# Patient Record
Sex: FEMALE | Race: BLACK OR AFRICAN AMERICAN | Employment: FULL TIME | URBAN - METROPOLITAN AREA
[De-identification: names, ages, dates, MRNs, and addresses within clinical notes are randomized per-mention and may not be internally consistent; named-entity substitution may affect disease eponyms.]

---

## 2017-01-18 ENCOUNTER — OFFICE VISIT (OUTPATIENT)
Dept: CARDIOLOGY CLINIC | Age: 67
End: 2017-01-18

## 2017-01-18 VITALS
WEIGHT: 139 LBS | HEIGHT: 62 IN | BODY MASS INDEX: 25.58 KG/M2 | SYSTOLIC BLOOD PRESSURE: 164 MMHG | HEART RATE: 68 BPM | DIASTOLIC BLOOD PRESSURE: 80 MMHG

## 2017-01-18 DIAGNOSIS — I10 ESSENTIAL HYPERTENSION: ICD-10-CM

## 2017-01-18 DIAGNOSIS — R00.0 TACHYCARDIA: Primary | ICD-10-CM

## 2017-01-18 DIAGNOSIS — I11.9 HYPERTENSIVE HEART DISEASE WITHOUT HEART FAILURE: ICD-10-CM

## 2017-01-18 DIAGNOSIS — E78.00 HYPERCHOLESTEROLEMIA: ICD-10-CM

## 2017-01-18 DIAGNOSIS — R00.2 PALPITATIONS: ICD-10-CM

## 2017-01-18 RX ORDER — METOPROLOL TARTRATE 25 MG/1
25 TABLET, FILM COATED ORAL 2 TIMES DAILY
Qty: 60 TAB | Refills: 6 | Status: CANCELLED | OUTPATIENT
Start: 2017-01-18

## 2017-01-18 NOTE — MR AVS SNAPSHOT
Visit Information Date & Time Provider Department Dept. Phone Encounter #  
 1/18/2017  2:00 PM Elin Cardenas MD [facility] [address] [id number] Your Appointments 2/23/2017  9:00 AM  
Follow Up with Elin Cardenas MD  
[facility] ([facility]) Appt Note: 1 month follow up [address] [address] [phone] [facility] [address] Upcoming Health Maintenance Date Due Hepatitis C Screening 1950 DTaP/Tdap/Td series (1 - Tdap) 11/10/1973 FOBT Q 1 YEAR AGE 50-75 11/10/2002 ZOSTER VACCINE AGE 60> 11/10/2012 PAP AKA CERVICAL CYTOLOGY 2/21/2015 BREAST CANCER SCRN MAMMOGRAM 6/25/2016 Allergies as of 1/18/2017  Review Complete On: 12/1/2016 By: Elin Cardenas MD  
  
 Severity Noted Reaction Type Reactions Pcn [Penicillins]  10/28/2010    Hives Current Immunizations  Reviewed on 6/17/2014 No immunizations on file. Not reviewed this visit Vitals BP Pulse Height(growth percentile) Weight(growth percentile) BMI OB Status 164/80 68 5' 2\" (1.575 m) 139 lb (63 kg) 25.42 kg/m2 Postmenopausal  
 Smoking Status Never Smoker Vitals History BMI and BSA Data Body Mass Index Body Surface Area  
 25.42 kg/m 2 1.66 m 2 Preferred Pharmacy Pharmacy Name Phone [facility] [address] [place] [address] [phone] Your Updated Medication List  
  
   
This list is accurate as of: 1/18/17  3:09 PM.  Always use your most recent med list. amLODIPine 2.5 mg tablet Commonly known as:  Thayer Mullet Take 1 Tab by mouth daily. hydroCHLOROthiazide 12.5 mg tablet Commonly known as:  HYDRODIURIL Take 1 Tab by mouth daily. OTHER  
COQ10  
  
 potassium chloride 20 mEq tablet Commonly known as:  K-XANDER, KLOR-CON Take 1 Tab by mouth daily. Introducing [facility]! Odilon Real introduces [employer] patient portal. Now you can access parts of your medical record, email your doctor's office, and request medication refills online. 1. In your internet browser, go to [email or web address] [email or web address] [email or web address] 2. Click on the First Time User? Click Here link in the Sign In box. You will see the New Member Sign Up page. 3. Enter your [employer] Access Code exactly as it appears below. You will not need to use this code after youve completed the sign-up process. If you do not sign up before the expiration date, you must request a new code. · [employer] Access Code: [email or web address] Expires: 1/28/2017 12:21 AM 
 
4. Enter the last four digits of your Social Security Number (xxxx) and Date of Birth (mm/dd/yyyy) as indicated and click Submit. You will be taken to the next sign-up page. 5. Create a [employer] ID. This will be your [employer] login ID and cannot be changed, so think of one that is secure and easy to remember. 6. Create a [employer] password. You can change your password at any time. 7. Enter your Password Reset Question and Answer. This can be used at a later time if you forget your password. 8. Enter your e-mail address. You will receive e-mail notification when new information is available in [address] 9. Click Sign Up. You can now view and download portions of your medical record. 10. Click the Download Summary menu link to download a portable copy of your medical information. If you have questions, please visit the Frequently Asked Questions section of the [employer] website. Remember, [employer] is NOT to be used for urgent needs. For medical emergencies, dial 911. Now available from your iPhone and Android! Please provide this summary of care documentation to your next provider. Your primary care clinician is listed as Leila Hayden. If you have any questions after today's visit, please call [phone].

## 2017-01-18 NOTE — PROGRESS NOTES
1. Have you been to the ER, urgent care clinic since your last visit? Hospitalized since your last visit? No    2. Have you seen or consulted any other health care providers outside of the [address] [place] [address] since your last visit? Include any pap smears or colon screening.  No    Verbal order and read back per Maribeth Gracia MD

## 2017-01-20 RX ORDER — METOPROLOL TARTRATE 25 MG/1
25 TABLET, FILM COATED ORAL 2 TIMES DAILY
Qty: 60 TAB | Refills: 3 | Status: SHIPPED | OUTPATIENT
Start: 2017-01-20 | End: 2017-02-23 | Stop reason: SDUPTHER

## 2017-01-20 NOTE — TELEPHONE ENCOUNTER
Please approve medication per Dr. Hauser Guest last office visit. We can't electronically sign yet.   Thank you

## 2017-01-21 PROBLEM — I11.9 HYPERTENSIVE HEART DISEASE WITHOUT HEART FAILURE: Status: ACTIVE | Noted: 2017-01-21

## 2017-01-21 NOTE — PROGRESS NOTES
PATIENT NAME: Andrea Chapa         61 y.o.      1950              DATE:1/18/2017    REASON FOR VISIT: Palpitations    HISTORY OF PRESENT ILLNESS:The patient's event recorder showed a few premature complexes although her symptoms seem to correlate with sinus tachycardia. The patient had an echocardiogram in November of last year. This showed normal left ventricular size and wall motion. No significant valvular disease. There was evidence for left ventricular hypertrophy and grade 1 diastolic dysfunction. The palpitations are most noticeable at night and are really quite bothersome to her. Denies chest pain. Experiences some shortness of breath on exertion. This is mild. Denies orthopnea and paroxysmal nocturnal dyspnea. The patient does have a history of hypokalemia. She was asked to take potassium supplementation but did not do so because she was afraid of side effects. PAST MEDICAL HISTORY:   Past Medical History:    CTS (carpal tunnel syndrome)                                  Hypercholesterolemia                            11/30/2010    Hypertension                                                  Hypokalemia                                     4/14/2011     Vitamin D deficiency                            11/30/2010    PAST SURGICAL HISTORY:   Past Surgical History:    HX BREAST BIOPSY                                 1990's          Comment:(+) Fibrocystic breast dis. [place] COLONOSCOPY FLX DX W/COLLJ SPEC WHEN PFRMD    2-       Comment:polyp; Dr Nikky Coffman HISTORY:  Social History    Marital status: SINGLE              Spouse name:                       Years of education:                 Number of children:               Social History Main Topics    Smoking status: Never Smoker                                                                Smokeless status: Never Used                        Alcohol use: No              Drug use:  No              Sexual activity: No ALLERGIES:    -- Pcn [Penicillins] -- Hives     CURRENT MEDICATIONS:   Current Outpatient Prescriptions:  potassium chloride (K-DUR, KLOR-CON) 20 mEq tablet, Take 1 Tab by mouth daily. OTHER, COQ10  amLODIPine (NORVASC) 2.5 mg tablet, Take 1 Tab by mouth daily. hydroCHLOROthiazide (HYDRODIURIL) 12.5 mg tablet, Take 1 Tab by mouth daily. metoprolol tartrate (LOPRESSOR) 25 mg tablet, Take 1 Tab by mouth two (2) times a day. No current facility-administered medications for this visit. REVIEW of SYSTEMS:History obtained from chart review and the patient  General ROS: negative for - weight gain or weight loss  Hematological and Lymphatic ROS: negative for - bleeding problems  Respiratory ROS: Please see history of present illness  Cardiovascular ROS: Please see history of present illness     PHYSICAL EXAMINATION:   /80  Pulse 68  Ht 5' 2\" (1.575 m)  Wt 63 kg (139 lb)  BMI 25.42 kg/m2  BP Readings from Last 3 Encounters:  01/18/17 : 164/80  12/01/16 : 148/82  11/09/16 : 134/68    Pulse Readings from Last 3 Encounters:  01/18/17 : 68  12/01/16 : 63  11/09/16 : 76    Wt Readings from Last 3 Encounters:  01/18/17 : 63 kg (139 lb)  12/01/16 : 63.9 kg (140 lb 12.8 oz)  11/09/16 : 63.5 kg (140 lb)    General: Well-developed -American female no apparent distress. Neck: No jugular venous distention. Carotid upstrokes 2+ without bruits. Chest: Clear to auscultation. Heart: PMI in the fifth intercostal space in the midclavicular line. No murmur or gallop. Cardiac rhythm regular. Abdomen: Nontender without masses. Extremities: No edema. Skin: Warm and dry. No stasis changes. Neurologic: Alert, oriented. No facial asymmetry. Speech within normal limits.     IMPRESSION:   Palpitations presumed secondary to inappropriate sinus tachycardia  Hypertension  Hypertensive cardiovascular disease without congestive heart failure  Hypokalemia    PLAN:  KCl 10 mEq daily  Metoprolol 25 mg twice daily  Return to office in 1 month    The diagnoses and plan were discussed with patient. All questions answered. Plan of care agreed to by all concerned. Gilmar Oconnor.  MD Jessi       ,

## 2017-02-23 ENCOUNTER — OFFICE VISIT (OUTPATIENT)
Dept: CARDIOLOGY CLINIC | Age: 67
End: 2017-02-23

## 2017-02-23 VITALS
BODY MASS INDEX: 25.58 KG/M2 | OXYGEN SATURATION: 99 % | WEIGHT: 139 LBS | DIASTOLIC BLOOD PRESSURE: 90 MMHG | HEART RATE: 62 BPM | SYSTOLIC BLOOD PRESSURE: 150 MMHG | HEIGHT: 62 IN

## 2017-02-23 DIAGNOSIS — I11.9 HYPERTENSIVE HEART DISEASE WITHOUT HEART FAILURE: ICD-10-CM

## 2017-02-23 DIAGNOSIS — R00.2 PALPITATIONS: Primary | ICD-10-CM

## 2017-02-23 RX ORDER — METOPROLOL TARTRATE 50 MG/1
50 TABLET ORAL 2 TIMES DAILY
Qty: 60 TAB | Refills: 11 | Status: SHIPPED | OUTPATIENT
Start: 2017-02-23 | End: 2017-07-06 | Stop reason: SDUPTHER

## 2017-02-23 NOTE — PROGRESS NOTES
PATIENT NAME: Venessa Everett         61 y.o.      1950              DATE:2/23/2017    REASON FOR VISIT: Hypertension. Palpitations    HISTORY OF PRESENT ILLNESS: Patient was begun on metoprolol 25 mg p.o. twice daily. Was supposed to be taking hydrochlorothiazide for blood pressure control. She began to feel \"funny\" while taking the hydrochlorothiazide and stop this. Her symptoms apparently consisted of a fullness in her head that she feels when her blood pressure is elevated. It is unclear as to why she stopped an antihypertensive if she felt that her blood pressure was elevated, but she does have a history of intermittent compliance. Continues to experience rapid palpitations. Has a history of inappropriate sinus tachycardia and premature ventricular complexes. Denies chest pain denies dyspnea on exertion, orthopnea, paroxysmal nocturnal dyspnea. Denies syncope and presyncope. PAST MEDICAL HISTORY:   Past Medical History:  No date: CTS (carpal tunnel syndrome)  11/30/2010: Hypercholesterolemia  No date: Hypertension  4/14/2011: Hypokalemia  11/30/2010: Vitamin D deficiency    PAST SURGICAL HISTORY:   Past Surgical History:  1990's: HX BREAST BIOPSY      Comment: (+) Fibrocystic breast dis. 2-: IN COLONOSCOPY FLX DX W/COLLJ SPEC WHEN PFRMD      Comment: polyp; Dr Javid Schroeder:  Social History    Marital status: SINGLE              Spouse name:                       Years of education:                 Number of children:               Social History Main Topics    Smoking status: Never Smoker                                                                Smokeless status: Never Used                        Alcohol use: No              Drug use:  No              Sexual activity: No                       ALLERGIES:    -- Pcn [Penicillins] -- Hives     CURRENT MEDICATIONS:   Current Outpatient Prescriptions:  metoprolol tartrate (LOPRESSOR) 50 mg tablet, Take 1 Tab by mouth two (2) times a day. potassium chloride (K-DUR, KLOR-CON) 20 mEq tablet, Take 1 Tab by mouth daily. OTHER, COQ10  amLODIPine (NORVASC) 2.5 mg tablet, Take 1 Tab by mouth daily. hydroCHLOROthiazide (HYDRODIURIL) 12.5 mg tablet, Take 1 Tab by mouth daily. No current facility-administered medications for this visit. REVIEW of SYSTEMS:History obtained from the patient  General ROS: negative for - weight gain or weight loss  Hematological and Lymphatic ROS: negative for - bleeding problems  Respiratory ROS: no cough, shortness of breath, or wheezing  Cardiovascular ROS: Please see history of present illness  Neurological ROS: no TIA or stroke symptoms     PHYSICAL EXAMINATION:   /90 (BP 1 Location: Right arm, BP Patient Position: Sitting)  Pulse 62  Ht 5' 2\" (1.575 m)  Wt 63 kg (139 lb)  SpO2 99%  BMI 25.42 kg/m2  BP Readings from Last 3 Encounters:  02/23/17 : 150/90  01/18/17 : 164/80  12/01/16 : 148/82    Pulse Readings from Last 3 Encounters:  02/23/17 : 62  01/18/17 : 68  12/01/16 : 63    Wt Readings from Last 3 Encounters:  02/23/17 : 63 kg (139 lb)  01/18/17 : 63 kg (139 lb)  12/01/16 : 63.9 kg (140 lb 12.8 oz)    General: Well-developed -American female no apparent distress  Neck: No jugular venous distention. Carotid upstrokes 2+ without bruits. Chest: Clear to auscultation. Heart: PMI not palpable. Regular rhythm. No murmur or gallop. Abdomen: Nontender without masses or organomegaly. Extremities: No edema. Dorsalis pedis and posterior tibial pulses intact. EKG: Sinus rhythm. First-degree AV block    IMPRESSION:   Hypertension poorly controlled  Poor compliance with medical regimen  Palpitations partially due to inappropriate sinus tachycardia partially due to premature complexes  First-degree AV block    PLAN:  The importance of taking antihypertensives on a regular basis discussed with the patient at length.   Increase metoprolol to 50 mg p.o. twice daily  Turn to office in 1 month    The diagnoses and plan were discussed with patient. All questions answered. Plan of care agreed to by all concerned. Irish Guerrier.  MD Jessi       ,

## 2017-02-23 NOTE — MR AVS SNAPSHOT
Visit Information Date & Time Provider Department Dept. Phone Encounter #  
 2/23/2017  9:00 AM Bashir Hill MD [facility] [address] [id number] Upcoming Health Maintenance Date Due Hepatitis C Screening 1950 DTaP/Tdap/Td series (1 - Tdap) 11/10/1973 FOBT Q 1 YEAR AGE 50-75 11/10/2002 ZOSTER VACCINE AGE 60> 11/10/2012 PAP AKA CERVICAL CYTOLOGY 2/21/2015 BREAST CANCER SCRN MAMMOGRAM 6/25/2016 Allergies as of 2/23/2017  Review Complete On: 1/21/2017 By: Bashri Hill MD  
  
 Severity Noted Reaction Type Reactions Pcn [Penicillins]  10/28/2010    Hives Current Immunizations  Reviewed on 6/17/2014 No immunizations on file. Not reviewed this visit You Were Diagnosed With   
  
 Codes Comments Palpitations    -  Primary ICD-10-CM: R00.2 ICD-9-CM: 785.1 Hypertensive heart disease without heart failure     ICD-10-CM: I11.9 ICD-9-CM: 402.90 Vitals BP  
  
  
  
  
  
 150/90 (BP 1 Location: Right arm, BP Patient Position: Sitting) Vitals History BMI and BSA Data Body Mass Index Body Surface Area  
 25.42 kg/m 2 1.66 m 2 Preferred Pharmacy Pharmacy Name Phone [facility] [address] [place] [address] [phone] Your Updated Medication List  
  
   
This list is accurate as of: 2/23/17 10:20 AM.  Always use your most recent med list. amLODIPine 2.5 mg tablet Commonly known as:  Bertha Croon Take 1 Tab by mouth daily. hydroCHLOROthiazide 12.5 mg tablet Commonly known as:  HYDRODIURIL Take 1 Tab by mouth daily. metoprolol tartrate 50 mg tablet Commonly known as:  LOPRESSOR Take 1 Tab by mouth two (2) times a day. OTHER  
COQ10  
  
 potassium chloride 20 mEq tablet Commonly known as:  K-DUR, KLOR-CON Take 1 Tab by mouth daily. Prescriptions Printed Refills metoprolol tartrate (LOPRESSOR) 50 mg tablet 11 Sig: Take 1 Tab by mouth two (2) times a day. Class: Print Route: Oral  
  
We Performed the Following AMB POC EKG ROUTINE W/ 12 LEADS, INTER & REP [23065 CPT(R)] Introducing [facility]! [employer] introduces [employer] patient portal. Now you can access parts of your medical record, email your doctor's office, and request medication refills online. 1. In your internet browser, go to [email or web address] 2. Click on the First Time User? Click Here link in the Sign In box. You will see the New Member Sign Up page. 3. Enter your [employer] Access Code exactly as it appears below. You will not need to use this code after youve completed the sign-up process. If you do not sign up before the expiration date, you must request a new code. · [employer] Access Code: [email or web address] Expires: 5/24/2017  8:41 AM 
 
4. Enter the last four digits of your Social Security Number (xxxx) and Date of Birth (mm/dd/yyyy) as indicated and click Submit. You will be taken to the next sign-up page. 5. Create a [employer] ID. This will be your [employer] login ID and cannot be changed, so think of one that is secure and easy to remember. 6. Create a [employer] password. You can change your password at any time. 7. Enter your Password Reset Question and Answer. This can be used at a later time if you forget your password. 8. Enter your e-mail address. You will receive e-mail notification when new information is available in [address] 9. Click Sign Up. You can now view and download portions of your medical record. 10. Click the Download Summary menu link to download a portable copy of your medical information. If you have questions, please visit the Frequently Asked Questions section of the [employer] website. Remember, [employer] is NOT to be used for urgent needs. For medical emergencies, dial 911. Now available from your iPhone and Android! Please provide this summary of care documentation to your next provider. Your primary care clinician is listed as Marylou Stephens. If you have any questions after today's visit, please call [phone].

## 2017-07-06 ENCOUNTER — TELEPHONE (OUTPATIENT)
Dept: CARDIOLOGY CLINIC | Age: 67
End: 2017-07-06

## 2017-07-06 NOTE — TELEPHONE ENCOUNTER
Patient called today requested refills for her Lopressor 25 mg twice a day. Per Dr. Padilla Dumont last office note 2/23/17, \"PLAN:  The importance of taking antihypertensives on a regular basis discussed with the patient at length. Increase metoprolol to 50 mg p.o. twice daily  Turn to office in 1 month. \"    Patient states she did not increase her Lopressor to 50 mg twice a day. I explained to patient Dr. Padilla Dumont wanted to increase lopressor due to her BP and palps. Patient states she is not going to increase med she does not want to get sick and she missed her follow up appointment do to work and she will have to call back to reschedule appointment when she can get time off. I made Dr. Padilla Dumont aware. Mckenzie Quesada     Verbal order and read back per Umesh Gutierrez MD   [facility] to continue Lopressor 25 mg twice a day    Prescription sent to [address] [place] [address] as patient requested.   Mckenzie Quesada

## 2017-07-07 RX ORDER — METOPROLOL TARTRATE 25 MG/1
25 TABLET, FILM COATED ORAL 2 TIMES DAILY
Qty: 60 TAB | Refills: 6 | OUTPATIENT
Start: 2017-07-07 | End: 2018-04-30 | Stop reason: SDUPTHER

## 2018-04-30 ENCOUNTER — OFFICE VISIT (OUTPATIENT)
Dept: CARDIOLOGY CLINIC | Age: 68
End: 2018-04-30

## 2018-04-30 VITALS
WEIGHT: 133 LBS | HEIGHT: 62 IN | OXYGEN SATURATION: 98 % | SYSTOLIC BLOOD PRESSURE: 140 MMHG | BODY MASS INDEX: 24.48 KG/M2 | DIASTOLIC BLOOD PRESSURE: 68 MMHG | HEART RATE: 72 BPM

## 2018-04-30 DIAGNOSIS — R00.2 PALPITATIONS: Primary | ICD-10-CM

## 2018-04-30 DIAGNOSIS — I10 ESSENTIAL HYPERTENSION: ICD-10-CM

## 2018-04-30 RX ORDER — METOPROLOL TARTRATE 50 MG/1
50 TABLET ORAL 2 TIMES DAILY
Qty: 60 TAB | Refills: 6 | Status: SHIPPED | OUTPATIENT
Start: 2018-04-30 | End: 2019-02-13 | Stop reason: ALTCHOICE

## 2018-04-30 NOTE — MR AVS SNAPSHOT
[address] [address] [address] [address] [phone] Patient: Lev Singer MRN: [id number] VWX:[id number] Visit Information Date & Time Provider Department Dept. Phone Encounter #  
 4/30/2018  8:40 AM Orlando Molina MD [facility] [address] [id number] Upcoming Health Maintenance Date Due Hepatitis C Screening 1950 DTaP/Tdap/Td series (1 - Tdap) 11/10/1973 FOBT Q 1 YEAR AGE 50-75 11/10/2002 ZOSTER VACCINE AGE 60> 9/10/2012 BREAST CANCER SCRN MAMMOGRAM 6/25/2016 GLAUCOMA SCREENING Q2Y 11/10/2017 Bone Densitometry (Dexa) Screening 11/10/2017 Pneumococcal 65+ Low/Medium Risk (1 of 2 - PCV13) 11/10/2017 Influenza Age 5 to Adult 8/1/2018 Allergies as of 4/30/2018  Review Complete On: 2/23/2017 By: Indio Costello Severity Noted Reaction Type Reactions Pcn [Penicillins]  10/28/2010    Hives Current Immunizations  Reviewed on 6/17/2014 No immunizations on file. Not reviewed this visit You Were Diagnosed With   
  
 Codes Comments Essential hypertension    -  Primary ICD-10-CM: I10 
ICD-9-CM: 401.9 Vitals BP Pulse Height(growth percentile) Weight(growth percentile) SpO2 BMI  
 140/68 72 5' 2\" (1.575 m) 133 lb (60.3 kg) 98% 24.33 kg/m2 OB Status Smoking Status Postmenopausal Never Smoker Vitals History BMI and BSA Data Body Mass Index Body Surface Area  
 24.33 kg/m 2 1.62 m 2 Preferred Pharmacy Pharmacy Name Phone [facility] [address] [phone] Your Updated Medication List  
  
   
This list is accurate as of 4/30/18  9:25 AM.  Always use your most recent med list.  
  
  
  
  
 metoprolol tartrate 25 mg tablet Commonly known as:  LOPRESSOR Take 1 Tab by mouth two (2) times a day.   
  
 OTHER  
COQ10  
  
  
  
  
 We Performed the Following AMB POC EKG ROUTINE W/ 12 LEADS, INTER & REP [14483 CPT(R)] Introducing [facility]! Karina Webb introduces [employer] patient portal. Now you can access parts of your medical record, email your doctor's office, and request medication refills online. 1. In your internet browser, go to [email or web address] 2. Click on the First Time User? Click Here link in the Sign In box. You will see the New Member Sign Up page. 3. Enter your [employer] Access Code exactly as it appears below. You will not need to use this code after youve completed the sign-up process. If you do not sign up before the expiration date, you must request a new code. · [employer] Access Code: [email or web address] Expires: 7/29/2018  8:15 AM 
 
4. Enter the last four digits of your Social Security Number (xxxx) and Date of Birth (mm/dd/yyyy) as indicated and click Submit. You will be taken to the next sign-up page. 5. Create a Tr[employer]is ID. This will be your Tr[employer] login ID and cannot be changed, so think of one that is secure and easy to remember. 6. Create a [employer] password. You can change your password at any time. 7. Enter your Password Reset Question and Answer. This can be used at a later time if you forget your password. 8. Enter your e-mail address. You will receive e-mail notification when new information is available in [address] 9. Click Sign Up. You can now view and download portions of your medical record. 10. Click the Download Summary menu link to download a portable copy of your medical information. If you have questions, please visit the Frequently Asked Questions section of the [employer] website. Remember, [employer] is NOT to be used for urgent needs. For medical emergencies, dial 911. Now available from your iPhone and Android! Please provide this summary of care documentation to your next provider. Your primary care clinician is listed as Afsaneh Peterson. If you have any questions after today's visit, please call [phone].

## 2018-04-30 NOTE — PROGRESS NOTES
1. Have you been to the ER, urgent care clinic since your last visit? Hospitalized since your last visit? No    2. Have you seen or consulted any other health care providers outside of the [address] [place] [address] since your last visit? Include any pap smears or colon screening.  No

## 2018-04-30 NOTE — PATIENT INSTRUCTIONS
Increase metoprolol to 50 mg twice daily. Take two 25 mg tablets twice a day until you run out of 25 mg tabs. Then you can refill the prescription for 50 mg tablets and you will take one of those twice daily.  Return in 2 weeks for blood pressure/heart rate check

## 2018-04-30 NOTE — PROGRESS NOTES
HISTORY OF PRESENT ILLNESS  Milan Bojorquez is a 74 y.o. female. HPI  This is a former patient of the late Dr. Maddison Jain who came for her first appointment with me. She continues with the recurrent palpitations as a fluttering or a racing heartbeat. She is unable to describe the duration of the palpitations but indicates that it lasts at times for half an hour. It could be as short as a few seconds as well. She denies associates symptoms other than palpitations. She has had no chest pain, dyspnea, orthopnea or PND. She has had no dizziness or syncope. She has had no symptoms to indicate TIA or amaurosis fugax. She had an echocardiogram on 11/8/2016 which demonstrated normal left ventricular systolic function with an EF in the 55-60% range. There was no significant valvular pathology. Left atrial dimension was normal with a volume index of 25 ml/m2. She had the 30 day event recorder in January of 2017 which demonstrated no significant tachyarrhythmia. She has been on metoprolol 25 mg twice a day. She is a nonsmoker. She has a history of hypertension but no diabetes. She has no family history of coronary artery disease. Review of Systems   Constitutional: Negative for malaise/fatigue and weight loss. HENT: Negative for hearing loss. Eyes: Negative for blurred vision and double vision. Respiratory: Negative for shortness of breath. Cardiovascular: Positive for palpitations. Negative for chest pain, orthopnea, claudication, leg swelling and PND. Gastrointestinal: Negative for blood in stool, heartburn and melena. Genitourinary: Negative for dysuria, frequency, hematuria and urgency. Musculoskeletal: Negative for back pain and joint pain. Skin: Negative for itching and rash. Neurological: Negative for dizziness, loss of consciousness and weakness. Psychiatric/Behavioral: Negative for depression and memory loss. The patient is nervous/anxious.         Physical Exam Constitutional: She is oriented to person, place, and time. She appears well-developed and well-nourished. HENT:   Head: Normocephalic and atraumatic. Eyes: Conjunctivae are normal. Pupils are equal, round, and reactive to light. Neck: Normal range of motion. Neck supple. No JVD present. Cardiovascular: Normal rate, regular rhythm, S1 normal and S2 normal.   No extrasystoles are present. PMI is not displaced. Exam reveals no gallop and no friction rub. No murmur heard. Pulses:       Carotid pulses are 3+ on the right side, and 3+ on the left side. Pulmonary/Chest: Effort normal. She has no rales. Abdominal: Soft. There is no tenderness. Musculoskeletal: She exhibits no edema. Neurological: She is alert and oriented to person, place, and time. No cranial nerve deficit. Skin: Skin is warm and dry. Psychiatric: She has a normal mood and affect. Her behavior is normal.     Visit Vitals    /68    Pulse 72    Ht 5' 2\" (1.575 m)    Wt 60.3 kg (133 lb)    SpO2 98%    BMI 24.33 kg/m2       Past Medical History:   Diagnosis Date    CTS (carpal tunnel syndrome)     Hypercholesterolemia 11/30/2010    Hypertension     Hypokalemia 4/14/2011    Vitamin D deficiency 11/30/2010       Social History     Social History    Marital status: SINGLE     Spouse name: N/A    Number of children: N/A    Years of education: N/A     Occupational History    Not on file.      Social History Main Topics    Smoking status: Never Smoker    Smokeless tobacco: Never Used    Alcohol use No    Drug use: No    Sexual activity: No     Other Topics Concern    Not on file     Social History Narrative       Family History   Problem Relation Age of Onset    Hypertension Mother     Heart Disease Mother    [place] Arthritis-rheumatoid Mother     Cancer Father     Heart Disease Father     Heart Attack Father     Cancer Sister      breast CA    Breast Cancer Sister 39    Other Son      brain tumor       Past Surgical History:   Procedure Laterality Date    HX BREAST BIOPSY  1990's    (+) Fibrocystic breast dis.  [place] COLONOSCOPY FLX DX W/COLLJ SPEC WHEN PFRMD  2-    polyp; Dr Douglas Adjutant       Current Outpatient Prescriptions   Medication Sig Dispense Refill    metoprolol tartrate (LOPRESSOR) 25 mg tablet Take 1 Tab by mouth two (2) times a day. 60 Tab 6    OTHER COQ10         EKG: normal EKG, normal sinus rhythm, unchanged from previous tracings. ASSESSMENT and PLAN  Encounter Diagnoses   Name Primary?  Palpitations Yes    Essential hypertension    She continues with recurrent palpitations. There has been no documented tachyarrhythmia thus far by 30 day event recorder. At this point, I will simply increase metoprolol to 50 mg twice a day for the purpose of controlling her palpitations and also blood pressure. If she continues with recurrent palpitations, I would then consider implantable loop recorder at that point.

## 2019-01-04 ENCOUNTER — TELEPHONE (OUTPATIENT)
Dept: CARDIOLOGY CLINIC | Age: 69
End: 2019-01-04

## 2019-01-04 NOTE — TELEPHONE ENCOUNTER
Dr. Ashley Lange received a letter from patient's insurance company stating: \"Our claims record suggests that your patient may be demonstrating a pattern of low adherence to cardiovascular  medication, and may be at high risk of disease exacerbation or complication. Medication nonadherence results in  approximately 125,000 preventable deaths a year. Patient education and engagement can help providers reduce the risk  for nonadherence. \"    Medication in question is metoprolol. Called patient to discuss. Left message on answering machine for her to call back.

## 2019-01-07 NOTE — TELEPHONE ENCOUNTER
Patient returned called verified name and , patient states she has been taking her Metoprolol 50mg once a day instead of twice a day, educated patient on the importance of taking her medication as prescribed, patient states will begin taking medication as prescribed, scheduled follow up appointment at patients request for 2019 patient declined first available appointment this month. This has been fully explained to the patient, who indicates understanding.

## 2019-02-12 ENCOUNTER — OFFICE VISIT (OUTPATIENT)
Dept: CARDIOLOGY CLINIC | Age: 69
End: 2019-02-12

## 2019-02-12 VITALS
SYSTOLIC BLOOD PRESSURE: 180 MMHG | HEIGHT: 62 IN | OXYGEN SATURATION: 99 % | BODY MASS INDEX: 24.48 KG/M2 | WEIGHT: 133 LBS | DIASTOLIC BLOOD PRESSURE: 92 MMHG | HEART RATE: 56 BPM

## 2019-02-12 DIAGNOSIS — I10 ESSENTIAL HYPERTENSION: ICD-10-CM

## 2019-02-12 DIAGNOSIS — I11.9 HYPERTENSIVE HEART DISEASE WITHOUT HEART FAILURE: ICD-10-CM

## 2019-02-12 DIAGNOSIS — R00.2 PALPITATIONS: Primary | ICD-10-CM

## 2019-02-12 RX ORDER — HYDROCHLOROTHIAZIDE 12.5 MG/1
12.5 CAPSULE ORAL DAILY
COMMUNITY
End: 2019-03-29

## 2019-02-12 RX ORDER — CARVEDILOL 25 MG/1
25 TABLET ORAL 2 TIMES DAILY WITH MEALS
Qty: 60 TAB | Refills: 6 | Status: SHIPPED | OUTPATIENT
Start: 2019-02-12 | End: 2019-03-04 | Stop reason: SINTOL

## 2019-02-12 RX ORDER — AMLODIPINE BESYLATE 2.5 MG/1
TABLET ORAL DAILY
COMMUNITY
End: 2019-03-04 | Stop reason: SDUPTHER

## 2019-02-12 NOTE — PROGRESS NOTES
HISTORY OF PRESENT ILLNESS  Nicole Huston is a 79 y.o. female. HPI  She continues with occasional palpitation as a flutter or flip flop, but no prolonged palpitation. The palpitation lasts for a few minutes at the longest. She continues with mild dyspnea on exertion chronically with no change. She denies chest pain, resting dyspnea, orthopnea, PND. She denies dizziness or syncope. She denies any symptoms of TIA or amaurosis fugax. She has been out of Amlodipine as her primary care physician has retired. Her blood pressure is elevated up to 180-190 today. She had an echocardiogram on 11/8/2016 which demonstrated normal left ventricular systolic function with an EF in the 55-60% range. There was no significant valvular pathology. Left atrial dimension was normal with a volume index of 25 ml/m2. She had the 30 day event recorder in January of 2017 which demonstrated no significant tachyarrhythmia. She has been on metoprolol 25 mg twice a day. She is a nonsmoker. She has a history of hypertension but no diabetes. She has no family history of coronary artery disease. Review of Systems   Constitutional: Negative for malaise/fatigue and weight loss. HENT: Negative for hearing loss. Eyes: Negative for blurred vision and double vision. Respiratory: Negative for shortness of breath. Cardiovascular: Positive for palpitations. Negative for chest pain, orthopnea, claudication and PND. Gastrointestinal: Negative for blood in stool, heartburn and melena. Genitourinary: Negative for dysuria, frequency, hematuria and urgency. Musculoskeletal: Negative for back pain and joint pain. Skin: Negative for itching and rash. Neurological: Negative for dizziness, loss of consciousness and weakness. Psychiatric/Behavioral: Negative for depression and memory loss. Physical Exam   Constitutional: She is oriented to person, place, and time. She appears well-developed and well-nourished.    HENT: Head: Normocephalic and atraumatic. Eyes: Conjunctivae are normal. Pupils are equal, round, and reactive to light. Neck: Normal range of motion. Neck supple. No JVD present. Cardiovascular: Normal rate, regular rhythm, S1 normal and S2 normal.  No extrasystoles are present. PMI is not displaced. Exam reveals no gallop and no friction rub. No murmur heard. Pulses:       Carotid pulses are 3+ on the right side, and 3+ on the left side. Pulmonary/Chest: Effort normal. She has no rales. Abdominal: Soft. There is no tenderness. Musculoskeletal: She exhibits no edema. Neurological: She is alert and oriented to person, place, and time. No cranial nerve deficit. Skin: Skin is warm and dry. Psychiatric: She has a normal mood and affect.  Her behavior is normal.     Visit Vitals  BP (!) 180/92   Pulse (!) 56   Ht 5' 2\" (1.575 m)   Wt 60.3 kg (133 lb)   SpO2 99%   BMI 24.33 kg/m²       Past Medical History:   Diagnosis Date    CTS (carpal tunnel syndrome)     Hypercholesterolemia 11/30/2010    Hypertension     Hypokalemia 4/14/2011    Vitamin D deficiency 11/30/2010       Social History     Socioeconomic History    Marital status: SINGLE     Spouse name: Not on file    Number of children: Not on file    Years of education: Not on file    Highest education level: Not on file   Social Needs    Financial resource strain: Not on file    Food insecurity - worry: Not on file    Food insecurity - inability: Not on file   [place] needs - medical: Not on file   [employer] needs - non-medical: Not on file   Occupational History    Not on file   Tobacco Use    Smoking status: Never Smoker    Smokeless tobacco: Never Used   Substance and Sexual Activity    Alcohol use: No    Drug use: No    Sexual activity: No   Other Topics Concern    Not on file   Social History Narrative    Not on file       Family History   Problem Relation Age of Onset    Hypertension Mother     Heart Disease Mother     Arthritis-rheumatoid Mother     Cancer Father     Heart Disease Father     Heart Attack Father    [place] Sister         breast CA    Breast Cancer Sister 39    Other Son         brain tumor       Past Surgical History:   Procedure Laterality Date    HX BREAST BIOPSY  1990's    (+) Fibrocystic breast dis.  [place] COLONOSCOPY FLX DX W/COLLJ SPEC WHEN PFRMD  2-    polyp; Dr Dotty Contreras       Current Outpatient Medications   Medication Sig Dispense Refill    hydroCHLOROthiazide (MICROZIDE) 12.5 mg capsule Take 12.5 mg by mouth daily.  metoprolol tartrate (LOPRESSOR) 50 mg tablet Take 1 Tab by mouth two (2) times a day. 60 Tab 6    amLODIPine (NORVASC) 2.5 mg tablet Take  by mouth daily. EKG: normal EKG, normal sinus rhythm, nonspecific ST and T waves changes, sinus bradycardia. ASSESSMENT and PLAN  Encounter Diagnoses   Name Primary?  Palpitations Yes    Essential hypertension     Hypertensive heart disease without heart failure    In general, she has been doing well. She has had no symptoms to indicate angina or cardiac decompensation. Her palpitation seems to be related to benign PVCs or PACs although it could possibly short bursts of atrial fibrillation or SVT. Since she has been off Amlodipine, her blood pressure has been out of control and at this point, I will try her on Carvedilol 25 mg twice daily in place of Metoprolol. If her blood pressure remains elevated, I would then add Amlodipine back to her regimen.

## 2019-02-12 NOTE — PATIENT INSTRUCTIONS
Stop taking  metoprolol   Start taking coreg 25mg twice daily   BMP and HR check in 2 weeks  Follow up in 6 monthsCarvedilol (By mouth)   Carvedilol (all-JE-vov-ol)  Treats high blood pressure and heart failure. Also reduces the risk of death after a heart attack. This medicine is a beta-blocker. Brand Name(s): Coreg, Coreg CR   There may be other brand names for this medicine. When This Medicine Should Not Be Used: This medicine is not right for everyone. Do not use it if you had an allergic reaction to carvedilol, or if you have asthma, severe liver disease, or certain heart problems. Ask your doctor about these heart problems. How to Use This Medicine:   Long Acting Capsule, Tablet  · Take your medicine as directed. Your dose may need to be changed several times to find what works best for you. · It is best to take this medicine with food or milk. · Extended-release capsule instructions:   ¨ Take the capsule in the morning with food. ¨ Swallow the capsule whole. Do not crush or chew it. ¨ If you cannot swallow the capsule, you may open it and sprinkle the medicine over a spoonful of applesauce. Swallow the applesauce right away. · Read and follow the patient instructions that come with this medicine. Talk to your doctor or pharmacist if you have any questions. · Store the medicine in a closed container at room temperature, away from heat, moisture, and direct light. · Missed dose: Take a dose as soon as you remember. If it is almost time for your next dose, wait until then and take a regular dose. Do not take extra medicine to make up for a missed dose. Drugs and Foods to Avoid:   Ask your doctor or pharmacist before using any other medicine, including over-the-counter medicines, vitamins, and herbal products. · Some medicines can affect how carvedilol works.  Tell your doctor if you are using amiodarone, clonidine, diltiazem, cyclosporine, digoxin, fluconazole, reserpine, rifampin, verapamil, or an MAO inhibitor (MAOI). Warnings While Using This Medicine:   · Tell your doctor if you are pregnant or breastfeeding, or if you have kidney disease, liver disease, bradycardia (slow heartbeat), coronary artery disease, circulation problems, edema (fluid retention or swelling), heart or blood vessel problems, low blood pressure, lung problems (such as bronchitis or emphysema), an overactive thyroid, pheochromocytoma, or frequent chest pains. Tell your doctor if you have a history of severe allergic reactions or if you are scheduled to have surgery. · This medicine may cause the following problems:   ¨ Changes to your blood sugar level (if you have diabetes, report any blood sugar level changes to your doctor)  ¨ Fewer tears than usual in contact lens wearers  ¨ An eye problem called Intraoperative Floppy Iris Syndrome during cataract surgery  · This medicine may make you dizzy or drowsy. Do not drive, use machines, or do anything else that could be dangerous if you are not alert. · Do not stop using this medicine suddenly. Your doctor will need to slowly decrease your dose before you stop it completely. · Keep all medicine out of the reach of children. Never share your medicine with anyone.   Possible Side Effects While Using This Medicine:   Call your doctor right away if you notice any of these side effects:  · Allergic reaction: Itching or hives, swelling in your face or hands, swelling or tingling in your mouth or throat, chest tightness, trouble breathing  · Change in how much or how often you urinate  · Chest pain that may spread to your arms, jaw, back, or neck, trouble breathing, nausea, unusual sweating, faintness  · Leg pain when you walk, legs and feet that feel cold or numb  · Lightheadedness, dizziness, or fainting  · Rapid weight gain, swelling in your hands, ankles, or feet  · Shaking, trembling, sweating, hunger, confusion  · Slow, fast, or uneven heartbeat  · Unusual bleeding or bruising  · Wheezing or trouble breathing  If you notice these less serious side effects, talk with your doctor:   · Diarrhea  · Trouble having sex  · Unusual tiredness or weakness  If you notice other side effects that you think are caused by this medicine, tell your doctor. Call your doctor for medical advice about side effects. You may report side effects to FDA at 5-717-VWT-3713  © 2017 [facility] Information is for End User's use only and may not be sold, redistributed or otherwise used for commercial purposes. The above information is an  only. It is not intended as medical advice for individual conditions or treatments. Talk to your doctor, nurse or pharmacist before following any medical regimen to see if it is safe and effective for you.

## 2019-02-12 NOTE — PROGRESS NOTES
Denisa Bagley presents today for   Chief Complaint   Patient presents with    Follow-up     10 month     Palpitations     4 times a week     Shortness of Breath     on exertion        Denisa Bagley preferred language for health care discussion is english/other. Is someone accompanying this pt? No     Is the patient using any DME equipment during OV? No     Depression Screening:  3 most recent PHQ Screens 2/11/2016   Little interest or pleasure in doing things Not at all   Feeling down, depressed, irritable, or hopeless Not at all   Total Score PHQ 2 0       Learning Assessment:  Learning Assessment 6/17/2014   PRIMARY LEARNER Patient   HIGHEST LEVEL OF EDUCATION - PRIMARY LEARNER  GRADUATED HIGH SCHOOL OR GED   BARRIERS PRIMARY LEARNER NONE   CO-LEARNER CAREGIVER No   PRIMARY LANGUAGE ENGLISH   LEARNER PREFERENCE PRIMARY LISTENING   ANSWERED BY patient   RELATIONSHIP SELF       Abuse Screening:  Abuse Screening Questionnaire 2/11/2016   Do you ever feel afraid of your partner? N   Are you in a relationship with someone who physically or mentally threatens you? N   Is it safe for you to go home? Y       Fall Risk  No flowsheet data found. Pt currently taking Antiplatelet therapy? No     Coordination of Care:  1. Have you been to the ER, urgent care clinic since your last visit? Hospitalized since your last visit? No     2. Have you seen or consulted any other health care providers outside of the [address] [place] [address] since your last visit? Include any pap smears or colon screening.  No

## 2019-02-14 NOTE — TELEPHONE ENCOUNTER
Patient called in regards to her Coreg that was prescribed when she saw Dr. Jose De Jesus Lynne yesterday after he instructed her to stop her Metoprolol. Patient stated she was having fluttering that wouldn't go away and she stated that she felt she was going to pass out 2 hours after she took the first dose of Coreg even though she ate food with her medication. Will review with one of the physicians in office since Dr. Jose De Jesus Lynne will not be in office until Monday.

## 2019-02-14 NOTE — TELEPHONE ENCOUNTER
Called pt back after reviewing with Dr. Majo Davidson. Per Dr. Majo Davidson, he would like for her to remain on Coreg 25 BID since it is better for blood pressure as her blood pressure was 180/92 yesterday in office. He stated for her to try taking it today and if she continues to have the same symptoms such as the palpitations and feeling like she was going to pass out, then she can switch back to her Metoprolol 50mg BID. Patient is to call if she continues having those symptoms or if they've subsided and to let us know if she has switched medications.

## 2019-03-04 ENCOUNTER — CLINICAL SUPPORT (OUTPATIENT)
Dept: CARDIOLOGY CLINIC | Age: 69
End: 2019-03-04

## 2019-03-04 VITALS — SYSTOLIC BLOOD PRESSURE: 166 MMHG | HEART RATE: 61 BPM | OXYGEN SATURATION: 98 % | DIASTOLIC BLOOD PRESSURE: 96 MMHG

## 2019-03-04 DIAGNOSIS — I11.9 HYPERTENSIVE HEART DISEASE WITHOUT HEART FAILURE: Primary | ICD-10-CM

## 2019-03-04 RX ORDER — METOPROLOL TARTRATE 50 MG/1
TABLET ORAL 2 TIMES DAILY
COMMUNITY
End: 2020-03-16 | Stop reason: SDUPTHER

## 2019-03-04 RX ORDER — AMLODIPINE BESYLATE 2.5 MG/1
2.5 TABLET ORAL DAILY
Qty: 30 TAB | Refills: 3 | Status: SHIPPED | OUTPATIENT
Start: 2019-03-04 | End: 2019-03-15 | Stop reason: SDUPTHER

## 2019-03-04 NOTE — PROGRESS NOTES
Patient presented today for blood pressure check after change of medication at last office visit. States she was unable to tolerated the change of metoprolol to carvedilol, states she started to experience worsening palpitations, nausea and lightheadedness. States she called the office and was told to stop Carvedilol and start taking metoprolol again. Her blood pressure was elevated today 166/96 and 168/98. Patient denies Chest pain, palpitations, dizziness, nausea or lightheadedness. Dr. Rosi Martínez was consulted and advised to continue taking Metoprolol 50 mg BID and start on Amlodipine 2.5 mg and to return in 1 week for blood pressure check.

## 2019-03-15 ENCOUNTER — CLINICAL SUPPORT (OUTPATIENT)
Dept: CARDIOLOGY CLINIC | Age: 69
End: 2019-03-15

## 2019-03-15 VITALS
BODY MASS INDEX: 25.21 KG/M2 | SYSTOLIC BLOOD PRESSURE: 160 MMHG | WEIGHT: 137 LBS | HEIGHT: 62 IN | OXYGEN SATURATION: 98 % | DIASTOLIC BLOOD PRESSURE: 86 MMHG | HEART RATE: 62 BPM

## 2019-03-15 DIAGNOSIS — I10 ESSENTIAL HYPERTENSION: Primary | ICD-10-CM

## 2019-03-15 RX ORDER — AMLODIPINE BESYLATE 2.5 MG/1
5 TABLET ORAL DAILY
Qty: 30 TAB | Refills: 3 | Status: CANCELLED | OUTPATIENT
Start: 2019-03-15

## 2019-03-15 RX ORDER — AMLODIPINE BESYLATE 2.5 MG/1
5 TABLET ORAL DAILY
Qty: 60 TAB | Refills: 3 | Status: SHIPPED | OUTPATIENT
Start: 2019-03-15 | End: 2020-03-16 | Stop reason: SDUPTHER

## 2019-03-15 NOTE — PROGRESS NOTES
Patient is here today for a 1 week BP check with no complaints. She mentioned that [facility] and systolic was in the 816W while today's BP is 160/86. Patient brought 2 of her medication bottles to today's visit which were the Amlodipine 2.5 mg daily and Metoprolol 50 mg BID, states she does not take Hydrochlorothiazide when asked. Dr. Peyman Alex was consulted and advised to increase Amlodipine to 5 mg daily. Patient will return in 2 weeks for BP check.

## 2019-03-29 ENCOUNTER — CLINICAL SUPPORT (OUTPATIENT)
Dept: CARDIOLOGY CLINIC | Age: 69
End: 2019-03-29

## 2019-03-29 VITALS
SYSTOLIC BLOOD PRESSURE: 142 MMHG | HEART RATE: 64 BPM | WEIGHT: 136 LBS | BODY MASS INDEX: 24.87 KG/M2 | OXYGEN SATURATION: 98 % | DIASTOLIC BLOOD PRESSURE: 84 MMHG

## 2019-03-29 DIAGNOSIS — I10 HYPERTENSION, UNSPECIFIED TYPE: Primary | ICD-10-CM

## 2019-03-29 NOTE — PROGRESS NOTES
Sruthi Merino is a 79 y.o. female that is here for a blood pressure check. Her current medications are listed below. Current Outpatient Medications   Medication Sig    amLODIPine (NORVASC) 2.5 mg tablet Take 2 Tabs by mouth daily.  metoprolol tartrate (LOPRESSOR) 50 mg tablet Take  by mouth two (2) times a day. No current facility-administered medications for this visit. Her   Visit Vitals  /84   Pulse 64   Wt 61.7 kg (136 lb)   SpO2 98%   BMI 24.87 kg/m²     Patient seen today for a 2 week BP check with no complaints. Patient stated she took her medication today at 8:00.

## 2019-05-10 DIAGNOSIS — R00.2 PALPITATIONS: ICD-10-CM

## 2019-05-10 DIAGNOSIS — I10 ESSENTIAL HYPERTENSION: ICD-10-CM

## 2019-05-10 RX ORDER — METOPROLOL TARTRATE 50 MG/1
TABLET ORAL
Qty: 60 TAB | Refills: 6 | Status: SHIPPED | OUTPATIENT
Start: 2019-05-10 | End: 2019-08-14 | Stop reason: SDUPTHER

## 2019-08-14 ENCOUNTER — OFFICE VISIT (OUTPATIENT)
Dept: CARDIOLOGY CLINIC | Age: 69
End: 2019-08-14

## 2019-08-14 VITALS
HEART RATE: 63 BPM | WEIGHT: 136 LBS | DIASTOLIC BLOOD PRESSURE: 72 MMHG | SYSTOLIC BLOOD PRESSURE: 138 MMHG | HEIGHT: 62 IN | OXYGEN SATURATION: 98 % | BODY MASS INDEX: 25.03 KG/M2

## 2019-08-14 DIAGNOSIS — I10 ESSENTIAL HYPERTENSION: ICD-10-CM

## 2019-08-14 DIAGNOSIS — R00.2 PALPITATIONS: Primary | ICD-10-CM

## 2019-08-14 DIAGNOSIS — I11.9 HYPERTENSIVE HEART DISEASE WITHOUT HEART FAILURE: ICD-10-CM

## 2019-08-14 NOTE — PROGRESS NOTES
HISTORY OF PRESENT ILLNESS  Katey Glass is a 79 y.o. female. HPI  She has been doing well. She has had no chest pain, dyspnea, orthopnea, PND. She continues with occasional palpitation as a flip flop or skipping lasting only for a few seconds. She denies prolonged palpitation. She has had no dizziness or syncope. She has had no symptoms of claudication, TIA or amaurosis fugax. Her blood pressure has been under control. She had an echocardiogram on 11/8/2016 which demonstrated normal left ventricular systolic function with an EF in the 55-60% range. Stacy Racer was no significant valvular pathology.  Left atrial dimension was normal with a volume index of 25 ml/m2.  She had the 30 day event recorder in January of 2017 which demonstrated no significant tachyarrhythmia. Vanessa Jacobo has been on metoprolol 25 mg twice a day. Vanessa Jacobo is a nonsmoker.  She has a history of hypertension but no diabetes.  She has no family history of coronary artery disease.      Review of Systems   Constitutional: Negative for malaise/fatigue and weight loss. HENT: Negative for hearing loss. Eyes: Negative for blurred vision and double vision. Respiratory: Negative for shortness of breath. Cardiovascular: Positive for palpitations. Negative for chest pain, orthopnea, claudication, leg swelling and PND. Gastrointestinal: Negative for blood in stool, heartburn and melena. Genitourinary: Negative for dysuria, frequency, hematuria and urgency. Musculoskeletal: Negative for back pain and joint pain. Skin: Negative for itching and rash. Neurological: Negative for dizziness and loss of consciousness. Psychiatric/Behavioral: Negative for depression and memory loss. Physical Exam   Constitutional: She is oriented to person, place, and time. She appears well-developed and well-nourished. HENT:   Head: Normocephalic and atraumatic. Eyes: Pupils are equal, round, and reactive to light.  Conjunctivae are normal.   Neck: Normal range of motion. Neck supple. No JVD present. Cardiovascular: Normal rate, regular rhythm, S1 normal and S2 normal.  No extrasystoles are present. PMI is not displaced. Exam reveals no gallop and no friction rub. Murmur heard. Harsh early systolic murmur is present at the upper right sternal border. Pulses:       Carotid pulses are 3+ on the right side, and 3+ on the left side. Pulmonary/Chest: Effort normal. She has no rales. Abdominal: Soft. There is no tenderness. Musculoskeletal: She exhibits no edema. Neurological: She is alert and oriented to person, place, and time. No cranial nerve deficit. Skin: Skin is warm and dry. Psychiatric: She has a normal mood and affect.  Her behavior is normal.     Visit Vitals  /72   Pulse 63   Ht 5' 2\" (1.575 m)   Wt 61.7 kg (136 lb)   SpO2 98%   BMI 24.87 kg/m²       Past Medical History:   Diagnosis Date    CTS (carpal tunnel syndrome)     Hypercholesterolemia 11/30/2010    Hypertension     Hypokalemia 4/14/2011    Vitamin D deficiency 11/30/2010       Social History     Socioeconomic History    Marital status: SINGLE     Spouse name: Not on file    Number of children: Not on file    Years of education: Not on file    Highest education level: Not on file   Occupational History    Not on file   Social Needs    Financial resource strain: Not on file    Food insecurity:     Worry: Not on file     Inability: Not on file    Transportation needs:     Medical: Not on file     Non-medical: Not on file   Tobacco Use    Smoking status: Never Smoker    Smokeless tobacco: Never Used   Substance and Sexual Activity    Alcohol use: No    Drug use: No    Sexual activity: Never   Lifestyle    Physical activity:     Days per week: Not on file     Minutes per session: Not on file    Stress: Not on file   Relationships    Social connections:     Talks on phone: Not on file     Gets together: Not on file     Attends Anabaptist service: Not on file Active member of club or organization: Not on file     Attends meetings of clubs or organizations: Not on file     Relationship status: Not on file    Intimate partner violence:     Fear of current or ex partner: Not on file     Emotionally abused: Not on file     Physically abused: Not on file     Forced sexual activity: Not on file   Other Topics Concern    Not on file   Social History Narrative    Not on file       Family History   Problem Relation Age of Onset    Hypertension Mother     Heart Disease Mother     Arthritis-rheumatoid Mother     Cancer Father     Heart Disease Father     Heart Attack Father    Wilmer Binet Sister         breast CA    Breast Cancer Sister 39    Other Son         brain tumor       Past Surgical History:   Procedure Laterality Date    HX BREAST BIOPSY  1990's    (+) Fibrocystic breast dis.  [place] COLONOSCOPY FLX DX W/COLLJ SPEC WHEN PFRMD  2-    polyp; Dr Ibrahima Post       Current Outpatient Medications   Medication Sig Dispense Refill    amLODIPine (NORVASC) 2.5 mg tablet Take 2 Tabs by mouth daily. 60 Tab 3    metoprolol tartrate (LOPRESSOR) 50 mg tablet Take  by mouth two (2) times a day. EKG: normal EKG, normal sinus rhythm, unchanged from previous tracings, nonspecific ST and T waves changes. ASSESSMENT and PLAN  Encounter Diagnoses   Name Primary?  Palpitations Yes    Essential hypertension     Hypertensive heart disease without heart failure    She has been doing well. She has had no symptoms to indicate angina or cardiac decompensation. Her palpitation is most likely secondary to benign PVCs or PACs which require no further diagnostic workup or treatment. Her blood pressure has been under control. For now, she will be continued on current medical regimen.

## 2019-08-14 NOTE — PROGRESS NOTES
Jacek Pinto presents today for   Chief Complaint   Patient presents with    Palpitations     6 month - no cardiac complaints       Jacek Pinto preferred language for health care discussion is english/other. Is someone accompanying this pt? no    Is the patient using any DME equipment during [address]? no    Depression Screening:  3 most recent PHQ Screens 2/11/2016   Little interest or pleasure in doing things Not at all   Feeling down, depressed, irritable, or hopeless Not at all   Total Score PHQ 2 0       Learning Assessment:  Learning Assessment 6/17/2014   PRIMARY LEARNER Patient   HIGHEST LEVEL OF EDUCATION - PRIMARY LEARNER  GRADUATED HIGH SCHOOL OR GED   BARRIERS PRIMARY LEARNER NONE   CO-LEARNER CAREGIVER No   PRIMARY LANGUAGE ENGLISH   LEARNER PREFERENCE PRIMARY LISTENING   ANSWERED BY patient   RELATIONSHIP SELF       Abuse Screening:  Abuse Screening Questionnaire 2/11/2016   Do you ever feel afraid of your partner? N   Are you in a relationship with someone who physically or mentally threatens you? N   Is it safe for you to go home? Y       Fall Risk  Fall Risk Assessment, last 12 mths 8/14/2019   Able to walk? Yes   Fall in past 12 months? No       Pt currently taking Anticoagulant therapy? no    Coordination of Care:  1. Have you been to the ER, urgent care clinic since your last visit? Hospitalized since your last visit? no    2. Have you seen or consulted any other health care providers outside of the [address] [place] [address] since your last visit? Include any pap smears or colon screening.  no

## 2020-03-16 RX ORDER — AMLODIPINE BESYLATE 2.5 MG/1
5 TABLET ORAL DAILY
Qty: 90 TAB | Refills: 3 | Status: SHIPPED | OUTPATIENT
Start: 2020-03-16 | End: 2020-10-30

## 2020-03-16 RX ORDER — METOPROLOL TARTRATE 50 MG/1
50 TABLET ORAL 2 TIMES DAILY
Qty: 180 TAB | Refills: 3 | Status: SHIPPED | OUTPATIENT
Start: 2020-03-16 | End: 2021-06-14 | Stop reason: SDUPTHER

## 2020-09-14 ENCOUNTER — OFFICE VISIT (OUTPATIENT)
Dept: CARDIOLOGY CLINIC | Age: 70
End: 2020-09-14

## 2020-09-14 VITALS
OXYGEN SATURATION: 98 % | DIASTOLIC BLOOD PRESSURE: 78 MMHG | BODY MASS INDEX: 26.5 KG/M2 | SYSTOLIC BLOOD PRESSURE: 152 MMHG | HEART RATE: 66 BPM | WEIGHT: 144 LBS | HEIGHT: 62 IN

## 2020-09-14 DIAGNOSIS — I10 ESSENTIAL HYPERTENSION: ICD-10-CM

## 2020-09-14 DIAGNOSIS — R00.2 PALPITATIONS: Primary | ICD-10-CM

## 2020-09-14 NOTE — PATIENT INSTRUCTIONS
If you have not heard from the central scheduler to schedule your testing in 48 hours, please call [phone].

## 2020-09-14 NOTE — PROGRESS NOTES
Tomasa St presents today for   Chief Complaint   Patient presents with    Follow-up     Prev [place] Pt., 1 year follow up       Tomasa St preferred language for health care discussion is english/other. Is someone accompanying this pt? no    Is the patient using any DME equipment during [address]? no    Depression Screening:  3 most recent PHQ Screens 9/14/2020   Little interest or pleasure in doing things Not at all   Feeling down, depressed, irritable, or hopeless Not at all   Total Score PHQ 2 0       Learning Assessment:  Learning Assessment 6/17/2014   PRIMARY LEARNER Patient   HIGHEST LEVEL OF EDUCATION - PRIMARY LEARNER  GRADUATED HIGH SCHOOL OR GED   BARRIERS PRIMARY LEARNER NONE   CO-LEARNER CAREGIVER No   PRIMARY LANGUAGE ENGLISH   LEARNER PREFERENCE PRIMARY LISTENING   ANSWERED BY patient   RELATIONSHIP SELF       Abuse Screening:  Abuse Screening Questionnaire 9/14/2020   Do you ever feel afraid of your partner? N   Are you in a relationship with someone who physically or mentally threatens you? N   Is it safe for you to go home? Y       Fall Risk  Fall Risk Assessment, last 12 mths 9/14/2020   Able to walk? Yes   Fall in past 12 months? No       Pt currently taking Anticoagulant therapy? no    Coordination of Care:  1. Have you been to the ER, urgent care clinic since your last visit? Hospitalized since your last visit? no    2. Have you seen or consulted any other health care providers outside of the [address] [place] [address] since your last visit? Include any pap smears or colon screening.  no

## 2020-09-14 NOTE — PROGRESS NOTES
HISTORY OF PRESENT ILLNESS  Tiki Bates is a 81 y.o. female. HPI    Patient presents for a follow-up office visit. She was previously followed by Dr. Bhupinder Song. She has a past medical history significant for essential hypertension and longstanding heart palpitations. She underwent an echocardiogram in 2016 which was essentially normal.  EF 55 to 60% with no valvular pathology. She then wore a 30-day event monitor in January 2017 which did not show any significant arrhythmias. She has been managed with metoprolol and amlodipine for her blood pressure. She was last seen in the office a little over a year ago. Since that time, she states her heart palpitations have been more frequent especially at night. She states she notices the most nights when she lays down and tries to go to sleep. It will last about 10 to 15 minutes but will improve when she sits up. She denies any heart palpitations during the day. No dizzy spells, syncope or near syncope. She has not experienced any chest pain or shortness of breath. No leg swelling, orthopnea, or PND. Past Medical History:   Diagnosis Date    CTS (carpal tunnel syndrome)     Hypercholesterolemia 11/30/2010    Hypertension     Hypokalemia 4/14/2011    Vitamin D deficiency 11/30/2010     Current Outpatient Medications   Medication Sig Dispense Refill    amLODIPine (NORVASC) 2.5 mg tablet Take 2 Tabs by mouth daily. 90 Tab 3    metoprolol tartrate (LOPRESSOR) 50 mg tablet Take 1 Tab by mouth two (2) times a day.  180 Tab 3     Allergies   Allergen Reactions    Pcn [Penicillins] Hives      Social History     Tobacco Use    Smoking status: Never Smoker    Smokeless tobacco: Never Used   Substance Use Topics    Alcohol use: No    Drug use: No     Family History   Problem Relation Age of Onset    Hypertension Mother     Heart Disease Mother     Arthritis-rheumatoid Mother     Cancer Father     Heart Disease Father     Heart Attack Father     Cancer Sister         breast CA    Breast Cancer Sister 39    Other Son         brain tumor         Review of Systems   Constitutional: Negative for chills, fever and weight loss. HENT: Negative for nosebleeds. Eyes: Negative for blurred vision and double vision. Respiratory: Negative for cough, shortness of breath and wheezing. Cardiovascular: Positive for palpitations. Negative for chest pain, orthopnea, claudication, leg swelling and PND. Gastrointestinal: Negative for abdominal pain, heartburn, nausea and vomiting. Genitourinary: Negative for dysuria and hematuria. Musculoskeletal: Negative for falls and myalgias. Skin: Negative for rash. Neurological: Negative for dizziness, focal weakness and headaches. Endo/Heme/Allergies: Does not bruise/bleed easily. Psychiatric/Behavioral: Negative for substance abuse. Visit Vitals  BP (!) 152/78 (BP 1 Location: Left arm, BP Patient Position: Sitting)   Pulse 66   Ht 5' 2\" (1.575 m)   Wt 65.3 kg (144 lb)   SpO2 98%   BMI 26.34 kg/m²       Physical Exam   Constitutional: She is oriented to person, place, and time. She appears well-developed and well-nourished. HENT:   Head: Normocephalic and atraumatic. Eyes: Conjunctivae are normal.   Neck: Neck supple. No JVD present. Carotid bruit is not present. Cardiovascular: Normal rate, regular rhythm, S1 normal, S2 normal and normal pulses. Exam reveals no gallop. No murmur heard. Pulmonary/Chest: Effort normal and breath sounds normal. She has no wheezes. She has no rales. Abdominal: Soft. Bowel sounds are normal. There is no abdominal tenderness. Musculoskeletal:         General: No tenderness, deformity or edema. Neurological: She is alert and oriented to person, place, and time. Skin: Skin is warm and dry. Psychiatric: She has a normal mood and affect.  Her behavior is normal. Thought content normal.     EKG: Normal sinus rhythm, normal axis, normal QTc interval, poor R wave progression, nonspecific ST abnormality. No change compared to the previous tracing. ASSESSMENT and PLAN  Encounter Diagnoses   Name Primary?  Palpitations Yes    Essential hypertension      Heart palpitations. Patient reports that they have increased in frequency since last visit. I recommended a 48-hour Holter monitor for further evaluation. For now I would continue her current metoprolol dosage. Essential hypertension. Patient's blood pressure is elevated today in the office today. She has been on a stable dose of amlodipine and metoprolol. I have asked her to start monitoring this more regularly over the next month. If her blood pressure remains elevated, I would consider adding a thiazide diuretic to her regimen. As long as her Holter monitor is unremarkable, patient can follow-up annually, sooner if needed.

## 2020-10-30 ENCOUNTER — OFFICE VISIT (OUTPATIENT)
Dept: CARDIOLOGY CLINIC | Age: 70
End: 2020-10-30
Payer: COMMERCIAL

## 2020-10-30 VITALS
HEART RATE: 60 BPM | TEMPERATURE: 97.7 F | WEIGHT: 147 LBS | BODY MASS INDEX: 27.05 KG/M2 | DIASTOLIC BLOOD PRESSURE: 74 MMHG | SYSTOLIC BLOOD PRESSURE: 159 MMHG | HEIGHT: 62 IN

## 2020-10-30 DIAGNOSIS — R09.89 BILATERAL CAROTID BRUITS: ICD-10-CM

## 2020-10-30 DIAGNOSIS — R01.1 MURMUR, CARDIAC: ICD-10-CM

## 2020-10-30 DIAGNOSIS — I10 ESSENTIAL HYPERTENSION: ICD-10-CM

## 2020-10-30 DIAGNOSIS — R00.2 PALPITATIONS: Primary | ICD-10-CM

## 2020-10-30 PROCEDURE — 99214 OFFICE O/P EST MOD 30 MIN: CPT | Performed by: INTERNAL MEDICINE

## 2020-10-30 PROCEDURE — 93000 ELECTROCARDIOGRAM COMPLETE: CPT | Performed by: INTERNAL MEDICINE

## 2020-10-30 RX ORDER — AMLODIPINE BESYLATE 10 MG/1
10 TABLET ORAL DAILY
Qty: 90 TAB | Refills: 1 | Status: SHIPPED | OUTPATIENT
Start: 2020-10-30 | End: 2021-06-14 | Stop reason: SDUPTHER

## 2020-10-30 NOTE — PROGRESS NOTES
HISTORY OF PRESENT ILLNESS  Nargis Leal is a 81 y.o. female. 10/20 seen as a new patient. Switching from Dr. Herman Todd. Palpitations    The history is provided by the patient. This is a chronic (2019) problem. The current episode started more than 1 week ago. The problem occurs daily (Usually felt in the evening when she is resting and sometimes wakes up from sleep). On average, each episode lasts 20 minutes (Continuous feeling of heartbeat in the years and sometimes it is fast). The problem is associated with nothing. Associated symptoms include dizziness (sometimes). Pertinent negatives include no fever, no malaise/fatigue, no chest pain, no claudication, no orthopnea, no PND, no nausea, no vomiting, no headaches, no cough and no shortness of breath. Allergies   Allergen Reactions    Pcn [Penicillins] Hives       Past Medical History:   Diagnosis Date    CTS (carpal tunnel syndrome)     Hypercholesterolemia 11/30/2010    Hypertension     Hypokalemia 4/14/2011    Vitamin D deficiency 11/30/2010       Family History   Problem Relation Age of Onset    Hypertension Mother     Heart Disease Mother    [facility] Arthritis-rheumatoid Mother     Cancer Father     Heart Disease Father     Heart Attack Father 64    Cancer Sister         breast CA    Breast Cancer Sister 39    Other Son         brain tumor    Stroke Neg Hx        Social History     Tobacco Use    Smoking status: Never Smoker    Smokeless tobacco: Never Used   Substance Use Topics    Alcohol use: No    Drug use: No        Current Outpatient Medications   Medication Sig    amLODIPine (NORVASC) 2.5 mg tablet Take 2 Tabs by mouth daily.  metoprolol tartrate (LOPRESSOR) 50 mg tablet Take 1 Tab by mouth two (2) times a day. No current facility-administered medications for this visit. Past Surgical History:   Procedure Laterality Date    HX BREAST BIOPSY  1990's    (+) Fibrocystic breast dis.     [place] COLONOSCOPY FLX DX W/COLLJ SPEC WHEN PFRMD  2-    polyp; Dr Janie Retana  BP (!) 159/74   Pulse 60   Temp 97.7 °F (36.5 °C)   Ht 5' 2\" (1.575 m)   Wt 66.7 kg (147 lb)   BMI 26.89 kg/m²       Diagnostic Studies:  I have reviewed the relevant tests done on the patient and show as follows  EKG tracings reviewed by me today. EKG Results     Procedure 720 Value Units Date/Time    AMB POC EKG ROUTINE W/ 12 LEADS, INTER & REP [[id number]] Resulted:  10/30/20 0819    Order Status:  Completed Updated:  10/30/20 0817        XR Results (most recent):  No results found for this or any previous visit. Ms. Lexa Espinoza has a reminder for a \"due or due soon\" health maintenance. I have asked that she contact her primary care provider for follow-up on this health maintenance. Review of Systems   Constitutional: Negative for chills, fever, malaise/fatigue and weight loss. HENT: Negative for nosebleeds. Eyes: Negative for discharge. Respiratory: Negative for cough, shortness of breath and wheezing. Cardiovascular: Positive for palpitations. Negative for chest pain, orthopnea, claudication, leg swelling and PND. Gastrointestinal: Negative for diarrhea, nausea and vomiting. Genitourinary: Negative for dysuria and hematuria. Musculoskeletal: Negative for joint pain. Skin: Negative for rash. Neurological: Positive for dizziness (sometimes). Negative for seizures, loss of consciousness and headaches. Endo/Heme/Allergies: Negative for polydipsia. Does not bruise/bleed easily. Psychiatric/Behavioral: Negative for depression and substance abuse. The patient does not have insomnia. 11/16 echo  55% EF, mild LVH, mild diastolic dysfunction    Physical Exam   Constitutional: She is oriented to person, place, and time. She appears well-developed and well-nourished. No distress. HENT:   Head: Normocephalic and atraumatic. Mouth/Throat: Normal dentition. Eyes: Right eye exhibits no discharge.  Left eye exhibits no discharge. No scleral icterus. Neck: Neck supple. No JVD present. Carotid bruit is present. No thyromegaly present. Cardiovascular: Normal rate, regular rhythm, S1 normal, S2 normal and intact distal pulses. Exam reveals no gallop and no friction rub. Murmur heard. Harsh early systolic murmur is present with a grade of 3/6 at the upper right sternal border radiating to the neck. Pulmonary/Chest: Effort normal and breath sounds normal. She has no wheezes. She has no rales. Abdominal: Soft. She exhibits no mass. There is no abdominal tenderness. Musculoskeletal:         General: No edema. Lymphadenopathy:        Right cervical: No superficial cervical adenopathy present. Left cervical: No superficial cervical adenopathy present. Neurological: She is alert and oriented to person, place, and time. Skin: Skin is warm and dry. No rash noted. Psychiatric: She has a normal mood and affect. Her behavior is normal.       ASSESSMENT and PLAN    Palpitations are chronic but recently increasing frequency. Get Holter monitor. Get an echo for murmur to rule out any aortic stenosis. Get carotid duplex to rule out carotid stenosis due to bruits which do not sound like radiation of the murmur. Recommended that she reduce the salt intake especially with processed meats. Diagnoses and all orders for this visit:    1. Palpitations  -     AMB POC EKG ROUTINE W/ 12 LEADS, INTER & REP  -     CARDIAC HOLTER MONITOR; Future  -     ECHO ADULT COMPLETE; Future    2. Essential hypertension  -     amLODIPine (NORVASC) 10 mg tablet; Take 1 Tab by mouth daily. 3. Murmur, cardiac  -     ECHO ADULT COMPLETE; Future    4. Bilateral carotid bruits  -     DUPLEX CAROTID BILATERAL        Pertinent laboratory and test data reviewed and discussed with patient.   See patient instructions also for other medical advice given    Medications Discontinued During This Encounter   Medication Reason    amLODIPine ([address]) 2.5 mg tablet        Follow-up and Dispositions    · Return in about 1 month (around 11/30/2020), or if symptoms worsen or fail to improve, for post test.

## 2020-10-30 NOTE — PROGRESS NOTES
1. Have you been to the ER, urgent care clinic since your last visit? Hospitalized since your last visit?     no    2. Have you seen or consulted any other health care providers outside of the [address] [place] [address] since your last visit? Include any pap smears or colon screening. yes pcp    3. Since your last visit, have you had any of the following symptoms?      palpitations, sob with activity, chest pain          4. Have you had any blood work, X-rays or cardiac testing? Yes at [facility]           5. Where do you normally have your labs drawn?  pcp     6. Do you need any refills today?   no

## 2020-10-30 NOTE — PATIENT INSTRUCTIONS
Medications Discontinued During This Encounter Medication Reason  amLODIPine (NORVASC) 2.5 mg tablet After the recommended changes have been made in blood pressure medicines, patient advised to keep BP/HR(pulse rate) chart twice daily and bring us results in next 4 to 5 days. Patient may send the results via \"My Chart\" if desired. Please rest for 5-10 minutes before checking blood pressure. Sit on a comfortable chair without crossing the legs and put your arm on a table. We recommend that you use an upper arm cuff. Check the blood pressure 3 times each time you check the blood pressure and record the lowest reading. If you check the blood pressure in both arms, use the higher reading. High Blood Pressure: Care Instructions Overview It's normal for blood pressure to go up and down throughout the day. But if it stays up, you have high blood pressure. Another name for high blood pressure is hypertension. Despite what a lot of people think, high blood pressure usually doesn't cause headaches or make you feel dizzy or lightheaded. It usually has no symptoms. But it does increase your risk of stroke, heart attack, and other problems. You and your doctor will talk about your risks of these problems based on your blood pressure. Your doctor will give you a goal for your blood pressure. Your goal will be based on your health and your age. Lifestyle changes, such as eating healthy and being active, are always important to help lower blood pressure. You might also take medicine to reach your blood pressure goal. 
Follow-up care is a key part of your treatment and safety. Be sure to make and go to all appointments, and call your doctor if you are having problems. It's also a good idea to know your test results and keep a list of the medicines you take. How can you care for yourself at home? Medical treatment · If you stop taking your medicine, your blood pressure will go back up. You may take one or more types of medicine to lower your blood pressure. Be safe with medicines. Take your medicine exactly as prescribed. Call your doctor if you think you are having a problem with your medicine. · Talk to your doctor before you start taking aspirin every day. Aspirin can help certain people lower their risk of a heart attack or stroke. But taking aspirin isn't right for everyone, because it can cause serious bleeding. · See your doctor regularly. You may need to see the doctor more often at first or until your blood pressure comes down. · If you are taking blood pressure medicine, talk to your doctor before you take decongestants or anti-inflammatory medicine, such as ibuprofen. Some of these medicines can raise blood pressure. · Learn how to check your blood pressure at home. Lifestyle changes · Stay at a healthy weight. This is especially important if you put on weight around the waist. Losing even 10 pounds can help you lower your blood pressure. · If your doctor recommends it, get more exercise. Walking is a good choice. Bit by bit, increase the amount you walk every day. Try for at least 30 minutes on most days of the week. You also may want to swim, bike, or do other activities. · Avoid or limit alcohol. Talk to your doctor about whether you can drink any alcohol. · Try to limit how much sodium you eat to less than 2,300 milligrams (mg) a day. Your doctor may ask you to try to eat less than 1,500 mg a day. · Eat plenty of fruits (such as bananas and oranges), vegetables, legumes, whole grains, and low-fat dairy products. · Lower the amount of saturated fat in your diet. Saturated fat is found in animal products such as milk, cheese, and meat. Limiting these foods may help you lose weight and also lower your risk for heart disease. · Do not smoke. Smoking increases your risk for heart attack and stroke.  If you need help quitting, talk to your doctor about stop-smoking programs and medicines. These can increase your chances of quitting for good. When should you call for help? Call  911 anytime you think you may need emergency care. This may mean having symptoms that suggest that your blood pressure is causing a serious heart or blood vessel problem. Your blood pressure may be over 180/120. For example, call 911 if: 
  · You have symptoms of a heart attack. These may include: 
? Chest pain or pressure, or a strange feeling in the chest. 
? Sweating. ? Shortness of breath. ? Nausea or vomiting. ? Pain, pressure, or a strange feeling in the back, neck, jaw, or upper belly or in one or both shoulders or arms. ? Lightheadedness or sudden weakness. ? A fast or irregular heartbeat.  
  · You have symptoms of a stroke. These may include: 
? Sudden numbness, tingling, weakness, or loss of movement in your face, arm, or leg, especially on only one side of your body. ? Sudden vision changes. ? Sudden trouble speaking. ? Sudden confusion or trouble understanding simple statements. ? Sudden problems with walking or balance. ? A sudden, severe headache that is different from past headaches.  
  · You have severe back or belly pain. Do not wait until your blood pressure comes down on its own. Get help right away. Call your doctor now or seek immediate care if: 
  · Your blood pressure is much higher than normal (such as 180/120 or higher), but you don't have symptoms.  
  · You think high blood pressure is causing symptoms, such as: 
? Severe headache. 
? Blurry vision. Watch closely for changes in your health, and be sure to contact your doctor if: 
  · Your blood pressure measures higher than your doctor recommends at least 2 times. That means the top number is higher or the bottom number is higher, or both.  
  · You think you may be having side effects from your blood pressure medicine. Where can you learn more? Go to http://www.Mowbly.com/ Enter B785 in the search box to learn more about \"High Blood Pressure: Care Instructions. \" Current as of: December 16, 2019               Content Version: 12.6 © 4542-9319 [employer]. Care instructions adapted under license by [employer] (which disclaims liability or warranty for this information). If you have questions about a medical condition or this instruction, always ask your healthcare professional. [place] [address] any warranty or liability for your use of this information.

## 2020-12-14 ENCOUNTER — OFFICE VISIT (OUTPATIENT)
Dept: CARDIOLOGY CLINIC | Age: 70
End: 2020-12-14
Payer: COMMERCIAL

## 2020-12-14 VITALS
TEMPERATURE: 97.3 F | BODY MASS INDEX: 26.76 KG/M2 | HEIGHT: 62 IN | SYSTOLIC BLOOD PRESSURE: 164 MMHG | DIASTOLIC BLOOD PRESSURE: 71 MMHG | WEIGHT: 145.4 LBS | OXYGEN SATURATION: 99 % | HEART RATE: 61 BPM

## 2020-12-14 DIAGNOSIS — R00.2 PALPITATIONS: Primary | ICD-10-CM

## 2020-12-14 DIAGNOSIS — I10 ESSENTIAL HYPERTENSION: ICD-10-CM

## 2020-12-14 DIAGNOSIS — R09.89 BILATERAL CAROTID BRUITS: ICD-10-CM

## 2020-12-14 PROCEDURE — 99213 OFFICE O/P EST LOW 20 MIN: CPT | Performed by: INTERNAL MEDICINE

## 2020-12-14 RX ORDER — LOSARTAN POTASSIUM 50 MG/1
50 TABLET ORAL DAILY
Qty: 30 TAB | Refills: 2 | Status: SHIPPED | OUTPATIENT
Start: 2020-12-14 | End: 2021-06-14 | Stop reason: SDDI

## 2020-12-14 NOTE — PROGRESS NOTES
HISTORY OF PRESENT ILLNESS  Eber Judge is a 78 y.o. female. 10/20 seen as a new patient. Switching from Dr. Tali Cool. Palpitations    The history is provided by the patient. This is a chronic (2019) problem. The current episode started more than 1 week ago. The problem has been gradually improving. The problem occurs daily (Usually felt in the evening when she is resting and sometimes wakes up from sleep). On average, each episode lasts 20 minutes (Continuous feeling of heartbeat in the years and sometimes it is fast). The problem is associated with nothing. Associated symptoms include dizziness (sometimes). Pertinent negatives include no fever, no malaise/fatigue, no chest pain, no claudication, no orthopnea, no PND, no nausea, no vomiting, no headaches, no cough and no shortness of breath. Her past medical history is significant for hypertension. Hypertension   The history is provided by the medical records. This is a chronic problem. Pertinent negatives include no chest pain, no headaches and no shortness of breath. Cholesterol Problem   The history is provided by the medical records. This is a chronic problem. Pertinent negatives include no chest pain, no headaches and no shortness of breath.      Allergies   Allergen Reactions    Pcn [Penicillins] Hives       Past Medical History:   Diagnosis Date    CTS (carpal tunnel syndrome)     Hypercholesterolemia 11/30/2010    Hypertension     Hypokalemia 4/14/2011    Vitamin D deficiency 11/30/2010       Family History   Problem Relation Age of Onset    Hypertension Mother     Heart Disease Mother    [facility] Arthritis-rheumatoid Mother     Cancer Father     Heart Disease Father     Heart Attack Father 64    Cancer Sister         breast CA    Breast Cancer Sister 39    Other Son         brain tumor    Stroke Neg Hx        Social History     Tobacco Use    Smoking status: Never Smoker    Smokeless tobacco: Never Used   Substance Use Topics    Alcohol use: No    Drug use: No        Current Outpatient Medications   Medication Sig    MAGNESIUM PO Take  by mouth.  amLODIPine (NORVASC) 10 mg tablet Take 1 Tab by mouth daily.  metoprolol tartrate (LOPRESSOR) 50 mg tablet Take 1 Tab by mouth two (2) times a day. No current facility-administered medications for this visit. Past Surgical History:   Procedure Laterality Date    HX BREAST BIOPSY  1990's    (+) Fibrocystic breast dis.  [place] COLONOSCOPY FLX DX W/COLLJ SPEC WHEN PFRMD  2-    polyp; Dr Maru Keith  BP (!) 164/71 (BP 1 Location: Right arm, BP Patient Position: Sitting)   Pulse 61   Temp 97.3 °F (36.3 °C) (Temporal)   Ht 5' 2\" (1.575 m)   Wt 66 kg (145 lb 6.4 oz)   SpO2 99%   BMI 26.59 kg/m²       Diagnostic Studies:  I have reviewed the relevant tests done on the patient and show as follows  EKG tracings reviewed by me today. EKG Results     None        XR Results (most recent):  No results found for this or any previous visit. Ms. La Caicedo has a reminder for a \"due or due soon\" health maintenance. I have asked that she contact her primary care provider for follow-up on this health maintenance. Review of Systems   Constitutional: Negative for chills, fever, malaise/fatigue and weight loss. HENT: Negative for nosebleeds. Eyes: Negative for discharge. Respiratory: Negative for cough, shortness of breath and wheezing. Cardiovascular: Positive for palpitations. Negative for chest pain, orthopnea, claudication, leg swelling and PND. Gastrointestinal: Negative for diarrhea, nausea and vomiting. Genitourinary: Negative for dysuria and hematuria. Musculoskeletal: Negative for joint pain. Skin: Negative for rash. Neurological: Positive for dizziness (sometimes). Negative for seizures, loss of consciousness and headaches. Endo/Heme/Allergies: Negative for polydipsia. Does not bruise/bleed easily.    Psychiatric/Behavioral: Negative for depression and substance abuse. The patient does not have insomnia. 11/16 echo  55% EF, mild LVH, mild diastolic dysfunction  16/29 echo  Interpretation Summary     · LV: Estimated LVEF is 60 - 65%. Normal cavity size and systolic function (ejection fraction normal). Mild concentric hypertrophy. Wall motion: normal. Mild (grade 1) left ventricular diastolic dysfunction. · MV: Trace mitral regurgitation. · TV: Trace tricuspid valve regurgitation is present. · PA: Pulmonary arterial systolic pressure is 23 mmHg. Comparison Study Information     Prior Study     There is a prior study available for comparison. Prior study date: 11/8/2016. As compared to the previous study, there are no significant changes. 11/20 Holter monitor  Normal sinus rhythm  Less than 1% PACs  2% PVCs with symptoms    Physical Exam   Constitutional: She is oriented to person, place, and time. She appears well-developed and well-nourished. No distress. HENT:   Head: Normocephalic and atraumatic. Mouth/Throat: Normal dentition. Eyes: Right eye exhibits no discharge. Left eye exhibits no discharge. No scleral icterus. Neck: Neck supple. No JVD present. Carotid bruit is present. No thyromegaly present. Cardiovascular: Normal rate, regular rhythm, S1 normal, S2 normal and intact distal pulses. Exam reveals no gallop and no friction rub. Murmur heard. Harsh early systolic murmur is present with a grade of 3/6 at the upper right sternal border radiating to the neck. Pulmonary/Chest: Effort normal and breath sounds normal. She has no wheezes. She has no rales. Abdominal: Soft. She exhibits no mass. There is no abdominal tenderness. Musculoskeletal:         General: No edema. Lymphadenopathy:        Right cervical: No superficial cervical adenopathy present. Left cervical: No superficial cervical adenopathy present. Neurological: She is alert and oriented to person, place, and time. Skin: Skin is warm and dry. No rash noted. Psychiatric: She has a normal mood and affect. Her behavior is normal.       ASSESSMENT and PLAN    10/20 Palpitations are chronic but recently increasing frequency. Get Holter monitor. Get an echo for murmur to rule out any aortic stenosis. Get carotid duplex to rule out carotid stenosis due to bruits which do not sound like radiation of the murmur. 12/20 palpitations are secondary to single PVCs and patient reassured that no further treatment is needed. Blood pressure is uncontrolled. Add losartan and follow home chart. Labs as ordered. Usual hypertensive guidelines discussed including regular walking and reducing salt intake. Recommended that she reduce the salt intake especially with processed meats. Diagnoses and all orders for this visit:    1. Palpitations    2. Essential hypertension  -     losartan (COZAAR) 50 mg tablet; Take 1 Tab by mouth daily.  -     METABOLIC PANEL, BASIC; Future    3. Bilateral carotid bruits  -     DUPLEX CAROTID BILATERAL        Pertinent laboratory and test data reviewed and discussed with patient. See patient instructions also for other medical advice given    There are no discontinued medications.     Follow-up and Dispositions    · Return in about 6 months (around 6/14/2021), or if symptoms worsen or fail to improve, for post test.

## 2020-12-14 NOTE — PROGRESS NOTES
1. Have you been to the ER, urgent care clinic since your last visit? Hospitalized since your last visit?     no    2. Have you seen or consulted any other health care providers outside of the [address] [place] [address] since your last visit? Include any pap smears or colon screening. No     3. Since your last visit, have you had any of the following symptoms?      palpitations. Explain:x 1 day ago     4. Have you had any blood work, X-rays or cardiac testing? yes     Requested: YES     In [facility]: YES    5. Where do you normally have your labs drawn? [facility]    6. Do you need any refills today?    no

## 2020-12-14 NOTE — PATIENT INSTRUCTIONS
There are no discontinued medications. After the recommended changes have been made in blood pressure medicines, patient advised to keep BP/HR(pulse rate) chart twice daily and bring us results in next 4 to 5 days. Patient may send the results via \"My Chart\" if desired. Please rest for 5-10 minutes before checking blood pressure. Sit on a comfortable chair without crossing the legs and put your arm on a table. We recommend that you use an upper arm cuff. Check the blood pressure 3 times each time you check the blood pressure and record the lowest reading. If you check the blood pressure in both arms, use the higher reading. Learning About the [facility] Diet What is the Mediterranean diet? The Mediterranean diet is a style of eating rather than a diet plan. It features foods eaten in [place], [place], [place] and [place], and other countries along the [facility]. It emphasizes eating foods like fish, fruits, vegetables, beans, high-fiber breads and whole grains, nuts, and olive oil. This style of eating includes limited red meat, cheese, and sweets. Why choose the Mediterranean diet? A Mediterranean-style diet may improve heart health. It contains more fat than other heart-healthy diets. But the fats are mainly from nuts, unsaturated oils (such as fish oils and olive oil), and certain nut or seed oils (such as canola, soybean, or flaxseed oil). These fats may help protect the heart and blood vessels. How can you get started on the Mediterranean diet? Here are some things you can do to switch to a more Mediterranean way of eating. What to eat · Eat a variety of fruits and vegetables each day, such as grapes, blueberries, tomatoes, broccoli, peppers, figs, olives, spinach, eggplant, beans, lentils, and chickpeas. · Eat a variety of whole-grain foods each day, such as oats, brown rice, and whole wheat bread, pasta, and couscous. · Eat fish at least 2 times a week. Try tuna, salmon, mackerel, lake trout, herring, or sardines. · Eat moderate amounts of low-fat dairy products, such as milk, cheese, or yogurt. · Eat moderate amounts of poultry and eggs. · Choose healthy (unsaturated) fats, such as nuts, olive oil, and certain nut or seed oils like canola, soybean, and flaxseed. · Limit unhealthy (saturated) fats, such as butter, palm oil, and coconut oil. And limit fats found in animal products, such as meat and dairy products made with whole milk. Try to eat red meat only a few times a month in very small amounts. · Limit sweets and desserts to only a few times a week. This includes sugar-sweetened drinks like soda. The Mediterranean diet may also include red wine with your meal1 glass each day for women and up to 2 glasses a day for men. Tips for eating at home · Use herbs, spices, garlic, lemon zest, and citrus juice instead of salt to add flavor to foods. · Add avocado slices to your sandwich instead of carter. · Have fish for lunch or dinner instead of red meat. Brush the fish with olive oil, and broil or grill it. · Sprinkle your salad with seeds or nuts instead of cheese. · Cook with olive or canola oil instead of butter or oils that are high in saturated fat. · Switch from 2% milk or whole milk to 1% or fat-free milk. · Dip raw vegetables in a vinaigrette dressing or hummus instead of dips made from mayonnaise or sour cream. 
· Have a piece of fruit for dessert instead of a piece of cake. Try baked apples, or have some dried fruit. Tips for eating out · Try broiled, grilled, baked, or poached fish instead of having it fried or breaded. · Ask your  to have your meals prepared with olive oil instead of butter. · Order dishes made with marinara sauce or sauces made from olive oil. Avoid sauces made from cream or mayonnaise.  
· Choose whole-grain breads, whole wheat pasta and pizza crust, brown rice, beans, and lentils. · Cut back on butter or margarine on bread. Instead, you can dip your bread in a small amount of olive oil. · Ask for a side salad or grilled vegetables instead of french fries or chips. Where can you learn more? Go to http://carl-gabby.info/ Enter 735-452-1788 in the search box to learn more about \"Learning About the Mediterranean Diet. \" Current as of: August 22, 2019               Content Version: 12.6 © 4662-2511 [employer]. Care instructions adapted under license by [employer] (which disclaims liability or warranty for this information). If you have questions about a medical condition or this instruction, always ask your healthcare professional. [address] any warranty or liability for your use of this information.

## 2021-02-01 DIAGNOSIS — I10 ESSENTIAL HYPERTENSION: ICD-10-CM

## 2021-04-01 ENCOUNTER — HOSPITAL ENCOUNTER (OUTPATIENT)
Dept: VASCULAR SURGERY | Age: 71
Discharge: HOME | End: 2021-04-01
Attending: INTERNAL MEDICINE
Payer: COMMERCIAL

## 2021-04-01 LAB
LEFT BULB EDV: 11.1 CM/S
LEFT BULB PSV: 55.1 CM/S
LEFT CCA DIST DIAS: 12.4 CM/S
LEFT CCA DIST SYS: 66.8 CM/S
LEFT CCA MID DIAS: 12.42 CM/S
LEFT CCA MID SYS: 74.54 CM/S
LEFT CCA PROX DIAS: 22 CM/S
LEFT CCA PROX SYS: 127.9 CM/S
LEFT ECA DIAS: 0 CM/S
LEFT ECA SYS: 70.8 CM/S
LEFT ICA DIST DIAS: 27.9 CM/S
LEFT ICA DIST SYS: 89.5 CM/S
LEFT ICA MID DIAS: 20.2 CM/S
LEFT ICA MID SYS: 60.3 CM/S
LEFT ICA PROX DIAS: 11.1 CM/S
LEFT ICA PROX SYS: 86.2 CM/S
LEFT ICA/CCA SYS: 1.34
LEFT SUBCLAVIAN DIAS: 0 CM/S
LEFT SUBCLAVIAN SYS: 118 CM/S
LEFT VERTEBRAL DIAS: 11.03 CM/S
LEFT VERTEBRAL SYS: 58.3 CM/S
RIGHT BULB EDV: 6.3 CM/S
RIGHT BULB PSV: 36.1 CM/S
RIGHT CCA DIST DIAS: 16.7 CM/S
RIGHT CCA DIST SYS: 69.8 CM/S
RIGHT CCA MID DIAS: 15.4 CM/S
RIGHT CCA MID SYS: 74.93 CM/S
RIGHT CCA PROX DIAS: 8.9 CM/S
RIGHT CCA PROX SYS: 46.5 CM/S
RIGHT ECA DIAS: 7.8 CM/S
RIGHT ECA SYS: 55.2 CM/S
RIGHT ICA DIST DIAS: 22.7 CM/S
RIGHT ICA DIST SYS: 77.4 CM/S
RIGHT ICA MID DIAS: 16.8 CM/S
RIGHT ICA MID SYS: 61.9 CM/S
RIGHT ICA PROX DIAS: 11.2 CM/S
RIGHT ICA PROX SYS: 66.5 CM/S
RIGHT ICA/CCA SYS: 1.1
RIGHT SUBCLAVIAN DIAS: 0 CM/S
RIGHT SUBCLAVIAN SYS: 161.4 CM/S
RIGHT VERTEBRAL DIAS: 8.54 CM/S
RIGHT VERTEBRAL SYS: 50 CM/S

## 2021-04-01 PROCEDURE — 93880 EXTRACRANIAL BILAT STUDY: CPT

## 2021-06-14 ENCOUNTER — OFFICE VISIT (OUTPATIENT)
Dept: CARDIOLOGY CLINIC | Age: 71
End: 2021-06-14
Payer: COMMERCIAL

## 2021-06-14 VITALS
HEART RATE: 58 BPM | OXYGEN SATURATION: 97 % | DIASTOLIC BLOOD PRESSURE: 82 MMHG | TEMPERATURE: 97.9 F | HEIGHT: 62 IN | SYSTOLIC BLOOD PRESSURE: 170 MMHG | WEIGHT: 146 LBS | BODY MASS INDEX: 26.87 KG/M2

## 2021-06-14 DIAGNOSIS — I49.3 PVC'S (PREMATURE VENTRICULAR CONTRACTIONS): ICD-10-CM

## 2021-06-14 DIAGNOSIS — I10 ESSENTIAL HYPERTENSION: Primary | ICD-10-CM

## 2021-06-14 PROCEDURE — 99213 OFFICE O/P EST LOW 20 MIN: CPT | Performed by: INTERNAL MEDICINE

## 2021-06-14 RX ORDER — AMLODIPINE BESYLATE 10 MG/1
10 TABLET ORAL DAILY
Qty: 90 TABLET | Refills: 3 | Status: SHIPPED | OUTPATIENT
Start: 2021-06-14 | End: 2022-04-21 | Stop reason: SDUPTHER

## 2021-06-14 RX ORDER — LOSARTAN POTASSIUM 50 MG/1
50 TABLET ORAL DAILY
Qty: 30 TABLET | Refills: 2 | Status: SHIPPED | OUTPATIENT
Start: 2021-06-14 | End: 2021-09-23

## 2021-06-14 RX ORDER — METOPROLOL TARTRATE 50 MG/1
50 TABLET ORAL 2 TIMES DAILY
Qty: 180 TABLET | Refills: 3 | Status: SHIPPED | OUTPATIENT
Start: 2021-06-14 | End: 2021-12-07 | Stop reason: SDUPTHER

## 2021-06-14 NOTE — PROGRESS NOTES
Patient didn't bring medications, verbally reviewed. 1. Have you been to the ER, urgent care clinic since your last visit? Hospitalized since your last visit? No    2. Have you seen or consulted any other health care providers outside of the [address] [place] [address] since your last visit? Include any pap smears or colon screening. Yes Where: PCP Reason for visit: Routine Visit     3. Since your last visit, have you had any of the following symptoms? Yes      palpitations. 4.  Have you had any blood work, X-rays or cardiac testing? Yes Where: [facility] Reason for visit: Labs     Requested: YES     In [facility]: YES    5. Where do you normally have your labs drawn? [facility]       6. Do you need any refills today?    Yes

## 2021-06-14 NOTE — PROGRESS NOTES
HISTORY OF PRESENT ILLNESS  Marcos Nieto is a 78 y.o. female. 10/20 seen as a new patient. Switching from Dr. Angella Bazan. Hypertension  The history is provided by the medical records. This is a chronic problem. Pertinent negatives include no chest pain, no headaches and no shortness of breath. Cholesterol Problem  The history is provided by the medical records. This is a chronic problem. Pertinent negatives include no chest pain, no headaches and no shortness of breath. Palpitations   The history is provided by the patient. This is a chronic (2019) problem. The current episode started more than 1 week ago. The problem has been gradually improving. The problem occurs daily (Usually felt in the evening when she is resting and sometimes wakes up from sleep). On average, each episode lasts 20 minutes (Continuous feeling of heartbeat in the years and sometimes it is fast). The problem is associated with nothing. Associated symptoms include dizziness (sometimes). Pertinent negatives include no fever, no malaise/fatigue, no chest pain, no claudication, no orthopnea, no PND, no nausea, no vomiting, no headaches, no cough and no shortness of breath. Her past medical history is significant for hypertension.      Allergies   Allergen Reactions    Pcn [Penicillins] Hives       Past Medical History:   Diagnosis Date    CTS (carpal tunnel syndrome)     Hypercholesterolemia 11/30/2010    Hypertension     Hypokalemia 4/14/2011    Vitamin D deficiency 11/30/2010       Family History   Problem Relation Age of Onset    Hypertension Mother     Heart Disease Mother    Ronny Morton Arthritis-rheumatoid Mother     Cancer Father     Heart Disease Father     Heart Attack Father 64    Cancer Sister         breast CA    Breast Cancer Sister 39    Other Son         brain tumor    Stroke Neg Hx        Social History     Tobacco Use    Smoking status: Never Smoker    Smokeless tobacco: Never Used   Substance Use Topics    Alcohol use: No    Drug use: No        Current Outpatient Medications   Medication Sig    MAGNESIUM PO Take  by mouth.  amLODIPine (NORVASC) 10 mg tablet Take 1 Tab by mouth daily.  metoprolol tartrate (LOPRESSOR) 50 mg tablet Take 1 Tab by mouth two (2) times a day. No current facility-administered medications for this visit. Past Surgical History:   Procedure Laterality Date    HX BREAST BIOPSY  1990's    (+) Fibrocystic breast dis.  OR COLONOSCOPY FLX DX W/COLLJ SPEC WHEN PFRMD  2-    polyp; Dr Jame Whyte  BP (!) 170/77 (BP 1 Location: Left arm, BP Patient Position: Sitting, BP Cuff Size: Adult)   Pulse (!) 58   Temp 97.9 °F (36.6 °C) (Temporal)   Ht 5' 2\" (1.575 m)   Wt 66.2 kg (146 lb)   SpO2 97%   BMI 26.70 kg/m²       Diagnostic Studies:  I have reviewed the relevant tests done on the patient and show as follows  EKG tracings reviewed by me today. EKG Results     None        XR Results (most recent):  No results found for this or any previous visit. Ms. Anastasiya Kaur has a reminder for a \"due or due soon\" health maintenance. I have asked that she contact her primary care provider for follow-up on this health maintenance. Review of Systems   Constitutional: Negative for chills, fever, malaise/fatigue and weight loss. HENT: Negative for nosebleeds. Eyes: Negative for discharge. Respiratory: Negative for cough, shortness of breath and wheezing. Cardiovascular: Positive for palpitations. Negative for chest pain, orthopnea, claudication, leg swelling and PND. Gastrointestinal: Negative for diarrhea, nausea and vomiting. Genitourinary: Negative for dysuria and hematuria. Musculoskeletal: Negative for joint pain. Skin: Negative for rash. Neurological: Positive for dizziness (sometimes). Negative for seizures, loss of consciousness and headaches. Endo/Heme/Allergies: Negative for polydipsia. Does not bruise/bleed easily.    Psychiatric/Behavioral: Negative for depression and substance abuse. The patient does not have insomnia. 11/16 echo  55% EF, mild LVH, mild diastolic dysfunction  37/41 echo  Interpretation Summary     · LV: Estimated LVEF is 60 - 65%. Normal cavity size and systolic function (ejection fraction normal). Mild concentric hypertrophy. Wall motion: normal. Mild (grade 1) left ventricular diastolic dysfunction. · MV: Trace mitral regurgitation. · TV: Trace tricuspid valve regurgitation is present. · PA: Pulmonary arterial systolic pressure is 23 mmHg. Comparison Study Information     Prior Study     There is a prior study available for comparison. Prior study date: 11/8/2016. As compared to the previous study, there are no significant changes. 11/20 Holter monitor  Normal sinus rhythm  Less than 1% PACs  2% PVCs with symptoms  4/21 carotid duplex  Interpretation Summary    Carotid Duplex:     1-49% Stenosis of the Internal Carotid Arteries. Bilateral antegrade flow of the Vertebral Arteries  Normal flow of the Subclavian Arteries       Physical Exam  Constitutional:       General: She is not in acute distress. Appearance: She is well-developed. HENT:      Head: Normocephalic and atraumatic. Mouth/Throat:      Dentition: Normal dentition. Eyes:      General: No scleral icterus. Right eye: No discharge. Left eye: No discharge. Neck:      Thyroid: No thyromegaly. Vascular: Carotid bruit present. No JVD. Cardiovascular:      Rate and Rhythm: Normal rate and regular rhythm. Pulses: Intact distal pulses. Heart sounds: S1 normal and S2 normal. Murmur heard. Harsh early systolic murmur is present with a grade of 3/6 at the upper right sternal border radiating to the neck. No friction rub. No gallop. Pulmonary:      Effort: Pulmonary effort is normal.      Breath sounds: Normal breath sounds. No wheezing or rales. Abdominal:      Palpations: Abdomen is soft. There is no mass. Tenderness: There is no abdominal tenderness. Musculoskeletal:      Cervical back: Neck supple. Lymphadenopathy:      Cervical:      Right cervical: No superficial cervical adenopathy. Left cervical: No superficial cervical adenopathy. Skin:     General: Skin is warm and dry. Findings: No rash. Neurological:      Mental Status: She is alert and oriented to person, place, and time. Psychiatric:         Behavior: Behavior normal.         ASSESSMENT and PLAN    10/20 Palpitations are chronic but recently increasing frequency. Get Holter monitor. Get an echo for murmur to rule out any aortic stenosis. Get carotid duplex to rule out carotid stenosis due to bruits which do not sound like radiation of the murmur. 12/20 palpitations are secondary to single PVCs and patient reassured that no further treatment is needed. Blood pressure is uncontrolled. Add losartan and follow home chart. Labs as ordered. Usual hypertensive guidelines discussed including regular walking and reducing salt intake. Recommended that she reduce the salt intake especially with processed meats. Diagnoses and all orders for this visit:    1. Essential hypertension  -     amLODIPine (NORVASC) 10 mg tablet; Take 1 Tablet by mouth daily. -     metoprolol tartrate (LOPRESSOR) 50 mg tablet; Take 1 Tablet by mouth two (2) times a day. -     losartan (COZAAR) 50 mg tablet; Take 1 Tablet by mouth daily.  -     LIPID PANEL; Future  -     METABOLIC PANEL, BASIC; Future    2. PVC's (premature ventricular contractions)        Pertinent laboratory and test data reviewed and discussed with patient.   See patient instructions also for other medical advice given    Medications Discontinued During This Encounter   Medication Reason    losartan (COZAAR) 50 mg tablet Non-Compliance    metoprolol tartrate (LOPRESSOR) 50 mg tablet REORDER    amLODIPine (NORVASC) 10 mg tablet REORDER       Follow-up and Dispositions    · Return in about 3 months (around 9/14/2021), or if symptoms worsen or fail to improve, for post test, BP log x 4-5 days post med changes. 6/14/2021 blood pressure is still elevated. She did not start losartan as was recommended in the previous visit. Represcribed and follow the home chart. Discussed with patient that goal blood pressure will be less than 495 systolic. She continues to have palpitations which are PVCs as recorded in the Holter monitor. Try to reassure that no specific treatment is required but lowering BP will likely help. Diet discussed and she is trying to follow. She gets enough steps at her work for exercise.

## 2021-06-14 NOTE — PATIENT INSTRUCTIONS
Medications Discontinued During This Encounter Medication Reason  losartan (COZAAR) 50 mg tablet Non-Compliance  metoprolol tartrate (LOPRESSOR) 50 mg tablet REORDER  
 amLODIPine (NORVASC) 10 mg tablet REORDER After the recommended changes have been made in blood pressure medicines, patient advised to keep BP/HR(pulse rate) chart twice daily and bring us results in next 4 to 5 days. Patient may send the results via \"My Chart\" if desired. Please rest for 5-10 minutes before checking blood pressure. Sit on a comfortable chair without crossing the legs and put your arm on a table. We recommend that you use an upper arm cuff. Check the blood pressure 3 times each time you check the blood pressure and record the lowest reading. If you check the blood pressure in both arms, use the higher reading. High Blood Pressure: Care Instructions Overview It's normal for blood pressure to go up and down throughout the day. But if it stays up, you have high blood pressure. Another name for high blood pressure is hypertension. Despite what a lot of people think, high blood pressure usually doesn't cause headaches or make you feel dizzy or lightheaded. It usually has no symptoms. But it does increase your risk of stroke, heart attack, and other problems. You and your doctor will talk about your risks of these problems based on your blood pressure. Your doctor will give you a goal for your blood pressure. Your goal will be based on your health and your age. Lifestyle changes, such as eating healthy and being active, are always important to help lower blood pressure. You might also take medicine to reach your blood pressure goal. 
Follow-up care is a key part of your treatment and safety. Be sure to make and go to all appointments, and call your doctor if you are having problems. It's also a good idea to know your test results and keep a list of the medicines you take.  
How can you care for yourself at home? 
Medical treatment · If you stop taking your medicine, your blood pressure will go back up. You may take one or more types of medicine to lower your blood pressure. Be safe with medicines. Take your medicine exactly as prescribed. Call your doctor if you think you are having a problem with your medicine. · Talk to your doctor before you start taking aspirin every day. Aspirin can help certain people lower their risk of a heart attack or stroke. But taking aspirin isn't right for everyone, because it can cause serious bleeding. · See your doctor regularly. You may need to see the doctor more often at first or until your blood pressure comes down. · If you are taking blood pressure medicine, talk to your doctor before you take decongestants or anti-inflammatory medicine, such as ibuprofen. Some of these medicines can raise blood pressure. · Learn how to check your blood pressure at home. Lifestyle changes · Stay at a healthy weight. This is especially important if you put on weight around the waist. Losing even 10 pounds can help you lower your blood pressure. · If your doctor recommends it, get more exercise. Walking is a good choice. Bit by bit, increase the amount you walk every day. Try for at least 30 minutes on most days of the week. You also may want to swim, bike, or do other activities. · Avoid or limit alcohol. Talk to your doctor about whether you can drink any alcohol. · Try to limit how much sodium you eat to less than 2,300 milligrams (mg) a day. Your doctor may ask you to try to eat less than 1,500 mg a day. · Eat plenty of fruits (such as bananas and oranges), vegetables, legumes, whole grains, and low-fat dairy products. · Lower the amount of saturated fat in your diet. Saturated fat is found in animal products such as milk, cheese, and meat. Limiting these foods may help you lose weight and also lower your risk for heart disease. · Do not smoke.  Smoking increases your risk for heart attack and stroke. If you need help quitting, talk to your doctor about stop-smoking programs and medicines. These can increase your chances of quitting for good. When should you call for help? Call  911 anytime you think you may need emergency care. This may mean having symptoms that suggest that your blood pressure is causing a serious heart or blood vessel problem. Your blood pressure may be over 180/120. For example, call 911 if: 
  · You have symptoms of a heart attack. These may include: 
? Chest pain or pressure, or a strange feeling in the chest. 
? Sweating. ? Shortness of breath. ? Nausea or vomiting. ? Pain, pressure, or a strange feeling in the back, neck, jaw, or upper belly or in one or both shoulders or arms. ? Lightheadedness or sudden weakness. ? A fast or irregular heartbeat.  
  · You have symptoms of a stroke. These may include: 
? Sudden numbness, tingling, weakness, or loss of movement in your face, arm, or leg, especially on only one side of your body. ? Sudden vision changes. ? Sudden trouble speaking. ? Sudden confusion or trouble understanding simple statements. ? Sudden problems with walking or balance. ? A sudden, severe headache that is different from past headaches.  
  · You have severe back or belly pain. Do not wait until your blood pressure comes down on its own. Get help right away. Call your doctor now or seek immediate care if: 
  · Your blood pressure is much higher than normal (such as 180/120 or higher), but you don't have symptoms.  
  · You think high blood pressure is causing symptoms, such as: 
? Severe headache. 
? Blurry vision. Watch closely for changes in your health, and be sure to contact your doctor if: 
  · Your blood pressure measures higher than your doctor recommends at least 2 times.  That means the top number is higher or the bottom number is higher, or both.  
  · You think you may be having side effects from your blood pressure medicine. Where can you learn more? Go to http://www.gray.com/ Enter O176 in the search box to learn more about \"High Blood Pressure: Care Instructions. \" Current as of: August 31, 2020               Content Version: 12.8 © 0524-8493 [employer]wise, [employer]. Care instructions adapted under license by [employer] (which disclaims liability or warranty for this information). If you have questions about a medical condition or this instruction, always ask your healthcare professional. [place] [address] any warranty or liability for your use of this information.

## 2021-07-08 ENCOUNTER — HOSPITAL ENCOUNTER (OUTPATIENT)
Dept: LAB | Age: 71
Discharge: HOME | End: 2021-07-08
Payer: COMMERCIAL

## 2021-07-08 DIAGNOSIS — I10 ESSENTIAL HYPERTENSION: ICD-10-CM

## 2021-07-08 LAB
ANION GAP SERPL CALC-SCNC: 5 MMOL/L (ref 3–18)
BUN SERPL-MCNC: 10 MG/DL (ref 7–18)
BUN/CREAT SERPL: 19 (ref 12–20)
CALCIUM SERPL-MCNC: 8.5 MG/DL (ref 8.5–10.1)
CHLORIDE SERPL-SCNC: 110 MMOL/L (ref 100–111)
CHOLEST SERPL-MCNC: 192 MG/DL
CO2 SERPL-SCNC: 27 MMOL/L (ref 21–32)
CREAT SERPL-MCNC: 0.52 MG/DL (ref 0.6–1.3)
GLUCOSE SERPL-MCNC: 94 MG/DL (ref 74–99)
HDLC SERPL-MCNC: 75 MG/DL (ref 40–60)
HDLC SERPL: 2.6 {RATIO} (ref 0–5)
LDLC SERPL CALC-MCNC: 102.4 MG/DL (ref 0–100)
LIPID PROFILE,FLP: ABNORMAL
POTASSIUM SERPL-SCNC: 4 MMOL/L (ref 3.5–5.5)
SODIUM SERPL-SCNC: 142 MMOL/L (ref 136–145)
TRIGL SERPL-MCNC: 73 MG/DL (ref ?–150)
VLDLC SERPL CALC-MCNC: 14.6 MG/DL

## 2021-07-08 PROCEDURE — 80048 BASIC METABOLIC PNL TOTAL CA: CPT

## 2021-07-08 PROCEDURE — 36415 COLL VENOUS BLD VENIPUNCTURE: CPT

## 2021-07-08 PROCEDURE — 80061 LIPID PANEL: CPT

## 2021-09-23 ENCOUNTER — OFFICE VISIT (OUTPATIENT)
Dept: CARDIOLOGY CLINIC | Age: 71
End: 2021-09-23
Payer: COMMERCIAL

## 2021-09-23 VITALS
HEART RATE: 73 BPM | HEIGHT: 62 IN | DIASTOLIC BLOOD PRESSURE: 71 MMHG | BODY MASS INDEX: 27.23 KG/M2 | WEIGHT: 148 LBS | SYSTOLIC BLOOD PRESSURE: 140 MMHG | OXYGEN SATURATION: 99 %

## 2021-09-23 DIAGNOSIS — E78.00 HYPERCHOLESTEROLEMIA: ICD-10-CM

## 2021-09-23 DIAGNOSIS — I49.3 PVC'S (PREMATURE VENTRICULAR CONTRACTIONS): ICD-10-CM

## 2021-09-23 DIAGNOSIS — I10 ESSENTIAL HYPERTENSION: Primary | ICD-10-CM

## 2021-09-23 PROCEDURE — 99214 OFFICE O/P EST MOD 30 MIN: CPT | Performed by: INTERNAL MEDICINE

## 2021-09-23 RX ORDER — LOSARTAN POTASSIUM 100 MG/1
100 TABLET ORAL DAILY
Qty: 90 TABLET | Refills: 1 | Status: SHIPPED | OUTPATIENT
Start: 2021-09-23 | End: 2022-04-21 | Stop reason: SDDI

## 2021-09-23 NOTE — PROGRESS NOTES
HISTORY OF PRESENT ILLNESS  Brandy Jamil is a 78 y.o. female. Follow-up of hypertension hyperlipidemia and PVCs. 10/20 seen as a new patient. Switching from Dr. Michele Muller. Hypertension  The history is provided by the medical records. This is a chronic problem. Pertinent negatives include no chest pain, no headaches and no shortness of breath. Palpitations   The history is provided by the patient. This is a chronic (2019) problem. The current episode started more than 1 week ago. The problem has been gradually improving. The problem occurs daily (Usually felt in the evening when she is resting and sometimes wakes up from sleep). On average, each episode lasts 20 minutes (Continuous feeling of heartbeat in the years and sometimes it is fast). The problem is associated with nothing. Associated symptoms include dizziness (sometimes). Pertinent negatives include no fever, no malaise/fatigue, no chest pain, no claudication, no orthopnea, no PND, no nausea, no vomiting, no headaches, no cough and no shortness of breath. Her past medical history is significant for hypertension. Cholesterol Problem  The history is provided by the medical records. This is a chronic problem. Pertinent negatives include no chest pain, no headaches and no shortness of breath. Follow-up  Pertinent negatives include no chest pain, no headaches and no shortness of breath.      Allergies   Allergen Reactions    Pcn [Penicillins] Hives       Past Medical History:   Diagnosis Date    CTS (carpal tunnel syndrome)     Hypercholesterolemia 11/30/2010    Hypertension     Hypokalemia 4/14/2011    Vitamin D deficiency 11/30/2010       Family History   Problem Relation Age of Onset    Hypertension Mother     Heart Disease Mother    Dar Knox Arthritis-rheumatoid Mother     Cancer Father     Heart Disease Father     Heart Attack Father 64    Cancer Sister         breast CA    Breast Cancer Sister 39    Other Son         brain tumor    Stroke Neg Hx        Social History     Tobacco Use    Smoking status: Never Smoker    Smokeless tobacco: Never Used   Substance Use Topics    Alcohol use: No    Drug use: No        Current Outpatient Medications   Medication Sig    amLODIPine (NORVASC) 10 mg tablet Take 1 Tablet by mouth daily.  metoprolol tartrate (LOPRESSOR) 50 mg tablet Take 1 Tablet by mouth two (2) times a day.  losartan (COZAAR) 50 mg tablet Take 1 Tablet by mouth daily.  MAGNESIUM PO Take  by mouth. No current facility-administered medications for this visit. Past Surgical History:   Procedure Laterality Date    HX BREAST BIOPSY  1990's    (+) Fibrocystic breast dis.  NV COLONOSCOPY FLX DX W/COLLJ SPEC WHEN PFRMD  2-    polyp; Dr Rolando Epstein  BP (!) 140/71 (BP 1 Location: Left upper arm, BP Patient Position: Sitting, BP Cuff Size: Adult)   Pulse 73   Ht 5' 2\" (1.575 m)   Wt 67.1 kg (148 lb)   SpO2 99%   BMI 27.07 kg/m²       Diagnostic Studies:  I have reviewed the relevant tests done on the patient and show as follows  EKG tracings reviewed by me today. EKG Results     None        XR Results (most recent):  No results found for this or any previous visit. Ms. Kenya Boyd has a reminder for a \"due or due soon\" health maintenance. I have asked that she contact her primary care provider for follow-up on this health maintenance. Review of Systems   Constitutional: Negative for chills, fever, malaise/fatigue and weight loss. HENT: Negative for nosebleeds. Eyes: Negative for discharge. Respiratory: Negative for cough, shortness of breath and wheezing. Cardiovascular: Positive for palpitations. Negative for chest pain, orthopnea, claudication, leg swelling and PND. Gastrointestinal: Negative for diarrhea, nausea and vomiting. Genitourinary: Negative for dysuria and hematuria. Musculoskeletal: Negative for joint pain. Skin: Negative for rash.    Neurological: Positive for dizziness (sometimes). Negative for seizures, loss of consciousness and headaches. Endo/Heme/Allergies: Negative for polydipsia. Does not bruise/bleed easily. Psychiatric/Behavioral: Negative for depression and substance abuse. The patient does not have insomnia. 11/16 echo  55% EF, mild LVH, mild diastolic dysfunction  66/17 echo  Interpretation Summary     · LV: Estimated LVEF is 60 - 65%. Normal cavity size and systolic function (ejection fraction normal). Mild concentric hypertrophy. Wall motion: normal. Mild (grade 1) left ventricular diastolic dysfunction. · MV: Trace mitral regurgitation. · TV: Trace tricuspid valve regurgitation is present. · PA: Pulmonary arterial systolic pressure is 23 mmHg. Comparison Study Information     Prior Study     There is a prior study available for comparison. Prior study date: 11/8/2016. As compared to the previous study, there are no significant changes. 11/20 Holter monitor  Normal sinus rhythm  Less than 1% PACs  2% PVCs with symptoms  4/21 carotid duplex  Interpretation Summary    Carotid Duplex:     1-49% Stenosis of the Internal Carotid Arteries. Bilateral antegrade flow of the Vertebral Arteries  Normal flow of the Subclavian Arteries       Physical Exam  Constitutional:       General: She is not in acute distress. Appearance: She is well-developed. HENT:      Head: Normocephalic and atraumatic. Mouth/Throat:      Dentition: Normal dentition. Eyes:      General: No scleral icterus. Right eye: No discharge. Left eye: No discharge. Neck:      Thyroid: No thyromegaly. Vascular: Carotid bruit present. No JVD. Cardiovascular:      Rate and Rhythm: Normal rate and regular rhythm. Pulses: Intact distal pulses. Heart sounds: S1 normal and S2 normal. Murmur heard. Harsh early systolic murmur is present with a grade of 3/6 at the upper right sternal border radiating to the neck. No friction rub. No gallop. Pulmonary:      Effort: Pulmonary effort is normal.      Breath sounds: Normal breath sounds. No wheezing or rales. Abdominal:      Palpations: Abdomen is soft. There is no mass. Tenderness: There is no abdominal tenderness. Musculoskeletal:      Cervical back: Neck supple. Lymphadenopathy:      Cervical:      Right cervical: No superficial cervical adenopathy. Left cervical: No superficial cervical adenopathy. Skin:     General: Skin is warm and dry. Findings: No rash. Neurological:      Mental Status: She is alert and oriented to person, place, and time. Psychiatric:         Behavior: Behavior normal.         ASSESSMENT and PLAN    Results for Ed Shane (MRN [id number]) as of 9/23/2021 10:05   Ref. Range 7/8/2021 09:14   Triglyceride Latest Ref Range: <150 MG/DL 73   Cholesterol, total Latest Ref Range: <200 MG/   HDL Cholesterol Latest Ref Range: 40 - 60 MG/DL 75 (H)   CHOL/HDL Ratio Latest Ref Range: 0 - 5.0   2.6   VLDL, calculated Latest Units: MG/DL 14.6   LDL, calculated Latest Ref Range: 0 - 100 MG/.4 (H)     10/20 Palpitations are chronic but recently increasing frequency. Get Holter monitor. Get an echo for murmur to rule out any aortic stenosis. Get carotid duplex to rule out carotid stenosis due to bruits which do not sound like radiation of the murmur. 12/20 palpitations are secondary to single PVCs and patient reassured that no further treatment is needed. Blood pressure is uncontrolled. Add losartan and follow home chart. Labs as ordered. Usual hypertensive guidelines discussed including regular walking and reducing salt intake. Recommended that she reduce the salt intake especially with processed meats. 6/14/2021 blood pressure is still elevated. She did not start losartan as was recommended in the previous visit. Represcribed and follow the home chart. Discussed with patient that goal blood pressure will be less than 680 systolic.   She continues to have palpitations which are PVCs as recorded in the Holter monitor. Try to reassure that no specific treatment is required but lowering BP will likely help. Diet discussed and she is trying to follow. She gets enough steps at her work for exercise. Diagnoses and all orders for this visit:    1. Essential hypertension  -     losartan (COZAAR) 100 mg tablet; Take 1 Tablet by mouth daily. 2. PVC's (premature ventricular contractions)    3. Hypercholesterolemia        Pertinent laboratory and test data reviewed and discussed with patient. See patient instructions also for other medical advice given    Medications Discontinued During This Encounter   Medication Reason    losartan (COZAAR) 50 mg tablet        Follow-up and Dispositions    · Return in about 6 months (around 3/23/2022), or if symptoms worsen or fail to improve, for BP log x 4-5 days post med changes. 9/23/2021 blood pressure is better but still minimally elevated. Increase losartan 200 mg a day and follow home chart. Reassured patient that her palpitations are due to PVCs which do not need any definite treatment at but we can keep an eye on the LV function and she should report any significant shortness of breath. Minimal hyperlipidemia seen and again diet guidelines were discussed. I do not think any statins are needed yet.

## 2021-09-23 NOTE — PATIENT INSTRUCTIONS
Medications Discontinued During This Encounter   Medication Reason    losartan (COZAAR) 50 mg tablet      After the recommended changes have been made in blood pressure medicines, patient advised to keep BP/HR(pulse rate) chart twice daily and bring us results in next 4 to 5 days. Patient may send the results via \"My Chart\" if desired. Please rest for 5-10 minutes before checking blood pressure. Sit on a comfortable chair without crossing the legs and put your arm on a table. We recommend that you use an upper arm cuff. Check the blood pressure 3 times each time you check the blood pressure and record the lowest reading. If you check the blood pressure in both arms, use the higher reading. Learning About the [facility] Diet  What is the Mediterranean diet? The Mediterranean diet is a style of eating rather than a diet plan. It features foods eaten in [place], [place], [place] and [place], and other countries along the [facility]. It emphasizes eating foods like fish, fruits, vegetables, beans, high-fiber breads and whole grains, nuts, and olive oil. This style of eating includes limited red meat, cheese, and sweets. Why choose the Mediterranean diet? A Mediterranean-style diet may improve heart health. It contains more fat than other heart-healthy diets. But the fats are mainly from nuts, unsaturated oils (such as fish oils and olive oil), and certain nut or seed oils (such as canola, soybean, or flaxseed oil). These fats may help protect the heart and blood vessels. How can you get started on the Mediterranean diet? Here are some things you can do to switch to a more Mediterranean way of eating. What to eat  · Eat a variety of fruits and vegetables each day, such as grapes, blueberries, tomatoes, broccoli, peppers, figs, olives, spinach, eggplant, beans, lentils, and chickpeas.   · Eat a variety of whole-grain foods each day, such as oats, brown rice, and whole wheat bread, pasta, and couscous. · Eat fish at least 2 times a week. Try tuna, salmon, mackerel, lake trout, herring, or sardines. · Eat moderate amounts of low-fat dairy products, such as milk, cheese, or yogurt. · Eat moderate amounts of poultry and eggs. · Choose healthy (unsaturated) fats, such as nuts, olive oil, and certain nut or seed oils like canola, soybean, and flaxseed. · Limit unhealthy (saturated) fats, such as butter, palm oil, and coconut oil. And limit fats found in animal products, such as meat and dairy products made with whole milk. Try to eat red meat only a few times a month in very small amounts. · Limit sweets and desserts to only a few times a week. This includes sugar-sweetened drinks like soda. The Mediterranean diet may also include red wine with your meal1 glass each day for women and up to 2 glasses a day for men. Tips for eating at home  · Use herbs, spices, garlic, lemon zest, and citrus juice instead of salt to add flavor to foods. · Add avocado slices to your sandwich instead of carter. · Have fish for lunch or dinner instead of red meat. Brush the fish with olive oil, and broil or grill it. · Sprinkle your salad with seeds or nuts instead of cheese. · Cook with olive or canola oil instead of butter or oils that are high in saturated fat. · Switch from 2% milk or whole milk to 1% or fat-free milk. · Dip raw vegetables in a vinaigrette dressing or hummus instead of dips made from mayonnaise or sour cream.  · Have a piece of fruit for dessert instead of a piece of cake. Try baked apples, or have some dried fruit. Tips for eating out  · Try broiled, grilled, baked, or poached fish instead of having it fried or breaded. · Ask your  to have your meals prepared with olive oil instead of butter. · Order dishes made with marinara sauce or sauces made from olive oil. Avoid sauces made from cream or mayonnaise.   · Choose whole-grain breads, whole wheat pasta and pizza crust, brown rice, beans, and lentils. · Cut back on butter or margarine on bread. Instead, you can dip your bread in a small amount of olive oil. · Ask for a side salad or grilled vegetables instead of french fries or chips. Where can you learn more? Go to http://www.manzano.com/  Enter O407 in the search box to learn more about \"Learning About the Mediterranean Diet. \"  Current as of: December 17, 2020               Content Version: 13.0  © 2006-2021 [employer]. Care instructions adapted under license by [employer] (which disclaims liability or warranty for this information). If you have questions about a medical condition or this instruction, always ask your healthcare professional. [address] any warranty or liability for your use of this information.

## 2021-09-23 NOTE — PROGRESS NOTES
1. Have you been to the ER, urgent care clinic since your last visit? Hospitalized since your last visit? No    2. Have you seen or consulted any other health care providers outside of the [address] [place] [address] since your last visit? Include any pap smears or colon screening. No     3. Since your last visit, have you had any of the following symptoms?      palpitations. 4.  Have you had any blood work, X-rays or cardiac testing? Yes Where: [facility]     Requested: YES     In [facility]: YES    5. Where do you normally have your labs drawn? [facility]    6. Do you need any refills today?    No

## 2021-12-07 DIAGNOSIS — I10 ESSENTIAL HYPERTENSION: ICD-10-CM

## 2021-12-07 RX ORDER — METOPROLOL TARTRATE 50 MG/1
50 TABLET ORAL 2 TIMES DAILY
Qty: 180 TABLET | Refills: 3 | Status: SHIPPED | OUTPATIENT
Start: 2021-12-07 | End: 2022-04-21 | Stop reason: SDUPTHER

## 2022-03-19 PROBLEM — I11.9 HYPERTENSIVE HEART DISEASE WITHOUT HEART FAILURE: Status: ACTIVE | Noted: 2017-01-21

## 2022-04-21 ENCOUNTER — OFFICE VISIT (OUTPATIENT)
Dept: CARDIOLOGY CLINIC | Age: 72
End: 2022-04-21
Payer: COMMERCIAL

## 2022-04-21 VITALS
HEIGHT: 62 IN | BODY MASS INDEX: 27.23 KG/M2 | WEIGHT: 148 LBS | DIASTOLIC BLOOD PRESSURE: 81 MMHG | SYSTOLIC BLOOD PRESSURE: 181 MMHG | OXYGEN SATURATION: 100 % | HEART RATE: 57 BPM

## 2022-04-21 DIAGNOSIS — I49.3 PVC'S (PREMATURE VENTRICULAR CONTRACTIONS): ICD-10-CM

## 2022-04-21 DIAGNOSIS — E78.00 HYPERCHOLESTEROLEMIA: ICD-10-CM

## 2022-04-21 DIAGNOSIS — I10 ESSENTIAL HYPERTENSION: Primary | ICD-10-CM

## 2022-04-21 PROCEDURE — 93000 ELECTROCARDIOGRAM COMPLETE: CPT | Performed by: INTERNAL MEDICINE

## 2022-04-21 PROCEDURE — 99214 OFFICE O/P EST MOD 30 MIN: CPT | Performed by: INTERNAL MEDICINE

## 2022-04-21 RX ORDER — AMLODIPINE BESYLATE 10 MG/1
10 TABLET ORAL DAILY
Qty: 90 TABLET | Refills: 3 | Status: SHIPPED | OUTPATIENT
Start: 2022-04-21

## 2022-04-21 RX ORDER — DEXTROMETHORPHAN HYDROBROMIDE, GUAIFENESIN 5; 100 MG/5ML; MG/5ML
650 LIQUID ORAL EVERY 8 HOURS
COMMUNITY

## 2022-04-21 RX ORDER — METOPROLOL TARTRATE 50 MG/1
50 TABLET ORAL 2 TIMES DAILY
Qty: 180 TABLET | Refills: 3 | Status: SHIPPED | OUTPATIENT
Start: 2022-04-21

## 2022-04-21 RX ORDER — LOSARTAN POTASSIUM 100 MG/1
100 TABLET ORAL DAILY
Qty: 90 TABLET | Refills: 1 | Status: SHIPPED | OUTPATIENT
Start: 2022-04-21

## 2022-04-21 NOTE — PATIENT INSTRUCTIONS
Medications Discontinued During This Encounter   Medication Reason    losartan (COZAAR) 100 mg tablet Non-Compliance    amLODIPine (NORVASC) 10 mg tablet REORDER    metoprolol tartrate (LOPRESSOR) 50 mg tablet REORDER     After the recommended changes have been made in blood pressure medicines, patient advised to keep BP/HR(pulse rate) chart twice daily and bring us results in next 4 to 5 days. Patient may send the results via \"My Chart\" if desired. Please rest for 5-10 minutes before checking blood pressure. Sit on a comfortable chair without crossing the legs and put your arm on a table. We recommend that you use an upper arm cuff. Check the blood pressure 3 times each time you check the blood pressure and record the lowest reading. If you check the blood pressure in both arms, use the higher reading. A Healthy Heart: Care Instructions  Your Care Instructions     Coronary artery disease, also called heart disease, occurs when a substance called plaque builds up in the vessels that supply oxygen-rich blood to your heart muscle. This can narrow the blood vessels and reduce blood flow. A heart attack happens when blood flow is completely blocked. A high-fat diet, smoking, and other factors increase the risk of heart disease. Your doctor has found that you have a chance of having heart disease. You can do lots of things to keep your heart healthy. It may not be easy, but you can change your diet, exercise more, and quit smoking. These steps really work to lower your chance of heart disease. Follow-up care is a key part of your treatment and safety. Be sure to make and go to all appointments, and call your doctor if you are having problems. It's also a good idea to know your test results and keep a list of the medicines you take. How can you care for yourself at home? Diet    · Use less salt when you cook and eat. This helps lower your blood pressure. Taste food before salting.  Add only a little salt when you think you need it. With time, your taste buds will adjust to less salt.     · Eat fewer snack items, fast foods, canned soups, and other high-salt, high-fat, processed foods.     · Read food labels and try to avoid saturated and trans fats. They increase your risk of heart disease by raising cholesterol levels.     · Limit the amount of solid fat-butter, margarine, and shortening-you eat. Use olive, peanut, or canola oil when you cook. Bake, broil, and steam foods instead of frying them.     · Eat a variety of fruit and vegetables every day. Dark green, deep orange, red, or yellow fruits and vegetables are especially good for you. Examples include spinach, carrots, peaches, and berries.     · Foods high in fiber can reduce your cholesterol and provide important vitamins and minerals. High-fiber foods include whole-grain cereals and breads, oatmeal, beans, brown rice, citrus fruits, and apples.     · Eat lean proteins. Heart-healthy proteins include seafood, lean meats and poultry, eggs, beans, peas, nuts, seeds, and soy products.     · Limit drinks and foods with added sugar. These include candy, desserts, and soda pop. Lifestyle changes    · If your doctor recommends it, get more exercise. Walking is a good choice. Bit by bit, increase the amount you walk every day. Try for at least 30 minutes on most days of the week. You also may want to swim, bike, or do other activities.     · Do not smoke. If you need help quitting, talk to your doctor about stop-smoking programs and medicines. These can increase your chances of quitting for good. Quitting smoking may be the most important step you can take to protect your heart. It is never too late to quit.     · Limit alcohol to 2 drinks a day for men and 1 drink a day for women. Too much alcohol can cause health problems.     · Manage other health problems such as diabetes, high blood pressure, and high cholesterol.  If you think you may have a problem with alcohol or drug use, talk to your doctor. Medicines    · Take your medicines exactly as prescribed. Call your doctor if you think you are having a problem with your medicine.     · If your doctor recommends aspirin, take the amount directed each day. Make sure you take aspirin and not another kind of pain reliever, such as acetaminophen (Tylenol). When should you call for help? Call 911 if you have symptoms of a heart attack. These may include:    · Chest pain or pressure, or a strange feeling in the chest.     · Sweating.     · Shortness of breath.     · Pain, pressure, or a strange feeling in the back, neck, jaw, or upper belly or in one or both shoulders or arms.     · Lightheadedness or sudden weakness.     · A fast or irregular heartbeat. After you call 911, the  may tell you to chew 1 adult-strength or 2 to 4 low-dose aspirin. Wait for an ambulance. Do not try to drive yourself. Watch closely for changes in your health, and be sure to contact your doctor if you have any problems. Where can you learn more? Go to http://www.manzano.com/  Enter F075 in the search box to learn more about \"A Healthy Heart: Care Instructions. \"  Current as of: January 10, 2022               Content Version: 13.2  © 2006-2022 [employer]. Care instructions adapted under license by [employer] (which disclaims liability or warranty for this information). If you have questions about a medical condition or this instruction, always ask your healthcare professional. [place] [address] any warranty or liability for your use of this information.

## 2022-04-21 NOTE — PROGRESS NOTES
HISTORY OF PRESENT ILLNESS  Jonah Chicas is a 73 y.o. female. Follow-up of hypertension hyperlipidemia and PVCs. 10/20 seen as a new patient. Switching from Dr. Xiomara Moses. 4/22 has pain in both arms and right side of the neck which gets worse on motion in a certain position. Follow-up  Pertinent negatives include no chest pain, no headaches and no shortness of breath. Palpitations   The history is provided by the patient. This is a chronic (2019) problem. The current episode started more than 1 week ago. The problem has been gradually improving. The problem occurs daily (Usually felt in the evening when she is resting and sometimes wakes up from sleep). On average, each episode lasts 20 minutes (Continuous feeling of heartbeat in the years and sometimes it is fast). The problem is associated with nothing. Associated symptoms include malaise/fatigue and dizziness (sometimes). Pertinent negatives include no fever, no chest pain, no claudication, no orthopnea, no PND, no nausea, no vomiting, no headaches, no cough and no shortness of breath. Her past medical history is significant for hypertension.      Allergies   Allergen Reactions    Pcn [Penicillins] Hives       Past Medical History:   Diagnosis Date    CTS (carpal tunnel syndrome)     Hypercholesterolemia 11/30/2010    Hypertension     Hypokalemia 4/14/2011    Vitamin D deficiency 11/30/2010       Family History   Problem Relation Age of Onset    Hypertension Mother     Heart Disease Mother    Maddi Rodrigez Arthritis-rheumatoid Mother     Cancer Father     Heart Disease Father     Heart Attack Father 64    Cancer Sister         breast CA    Breast Cancer Sister 39    Other Son         brain tumor    Stroke Neg Hx        Social History     Tobacco Use    Smoking status: Never Smoker    Smokeless tobacco: Never Used   Substance Use Topics    Alcohol use: No    Drug use: No        Current Outpatient Medications   Medication Sig    acetaminophen (Tylenol Arthritis Pain) 650 mg TbER Take 650 mg by mouth every eight (8) hours.  metoprolol tartrate (LOPRESSOR) 50 mg tablet Take 1 Tablet by mouth two (2) times a day.  amLODIPine (NORVASC) 10 mg tablet Take 1 Tablet by mouth daily.  MAGNESIUM PO Take  by mouth. No current facility-administered medications for this visit. Past Surgical History:   Procedure Laterality Date    HX BREAST BIOPSY  1990's    (+) Fibrocystic breast dis.  IN COLONOSCOPY FLX DX W/COLLJ SPEC WHEN PFRMD  2-    polyp; Dr Adorno Noss  BP (!) 181/81   Pulse (!) 57   Ht 5' 2\" (1.575 m)   Wt 67.1 kg (148 lb)   SpO2 100%   BMI 27.07 kg/m²       Diagnostic Studies:  I have reviewed the relevant tests done on the patient and show as follows  EKG tracings reviewed by me today. EKG Results     Procedure 720 Value Units Date/Time    AMB POC EKG ROUTINE W/ 12 LEADS, INTER & REP [[id number]] Resulted: 04/21/22 1243    Order Status: Completed Updated: 04/21/22 1233        XR Results (most recent):  No results found for this or any previous visit. Ms. Leo Vazquez has a reminder for a \"due or due soon\" health maintenance. I have asked that she contact her primary care provider for follow-up on this health maintenance. Review of Systems   Constitutional: Positive for malaise/fatigue. Negative for chills, fever and weight loss. HENT: Negative for nosebleeds. Eyes: Negative for discharge. Respiratory: Negative for cough, shortness of breath and wheezing. Cardiovascular: Positive for palpitations. Negative for chest pain, orthopnea, claudication, leg swelling and PND. Gastrointestinal: Negative for diarrhea, nausea and vomiting. Genitourinary: Negative for dysuria and hematuria. Musculoskeletal: Negative for joint pain. Skin: Negative for rash. Neurological: Positive for dizziness (sometimes). Negative for seizures, loss of consciousness and headaches.    Endo/Heme/Allergies: Negative for polydipsia. Does not bruise/bleed easily. Psychiatric/Behavioral: Negative for depression and substance abuse. The patient does not have insomnia. 11/16 echo  55% EF, mild LVH, mild diastolic dysfunction  88/85 echo  Interpretation Summary     · LV: Estimated LVEF is 60 - 65%. Normal cavity size and systolic function (ejection fraction normal). Mild concentric hypertrophy. Wall motion: normal. Mild (grade 1) left ventricular diastolic dysfunction. · MV: Trace mitral regurgitation. · TV: Trace tricuspid valve regurgitation is present. · PA: Pulmonary arterial systolic pressure is 23 mmHg. Comparison Study Information     Prior Study     There is a prior study available for comparison. Prior study date: 11/8/2016. As compared to the previous study, there are no significant changes. 11/20 Holter monitor  Normal sinus rhythm  Less than 1% PACs  2% PVCs with symptoms  4/21 carotid duplex  Interpretation Summary    Carotid Duplex:     1-49% Stenosis of the Internal Carotid Arteries. Bilateral antegrade flow of the Vertebral Arteries  Normal flow of the Subclavian Arteries       Physical Exam  Constitutional:       General: She is not in acute distress. Appearance: She is well-developed. HENT:      Head: Normocephalic and atraumatic. Mouth/Throat:      Dentition: Normal dentition. Eyes:      General: No scleral icterus. Right eye: No discharge. Left eye: No discharge. Neck:      Thyroid: No thyromegaly. Vascular: Carotid bruit present. No JVD. Cardiovascular:      Rate and Rhythm: Normal rate and regular rhythm. Pulses: Intact distal pulses. Heart sounds: S1 normal and S2 normal. Murmur heard. Harsh early systolic murmur is present with a grade of 3/6 at the upper right sternal border radiating to the neck. No friction rub. No gallop. Pulmonary:      Effort: Pulmonary effort is normal.      Breath sounds: Normal breath sounds. No wheezing or rales. Abdominal:      Palpations: Abdomen is soft. There is no mass. Tenderness: There is no abdominal tenderness. Musculoskeletal:      Cervical back: Neck supple. Right lower leg: No edema. Left lower leg: No edema. Lymphadenopathy:      Cervical:      Right cervical: No superficial cervical adenopathy. Left cervical: No superficial cervical adenopathy. Skin:     General: Skin is warm and dry. Findings: No rash. Neurological:      Mental Status: She is alert and oriented to person, place, and time. Psychiatric:         Behavior: Behavior normal.         ASSESSMENT and PLAN    Results for Demetrice Stone (MRN [id number]) as of 9/23/2021 10:05   Ref. Range 7/8/2021 09:14   Triglyceride Latest Ref Range: <150 MG/DL 73   Cholesterol, total Latest Ref Range: <200 MG/   HDL Cholesterol Latest Ref Range: 40 - 60 MG/DL 75 (H)   CHOL/HDL Ratio Latest Ref Range: 0 - 5.0   2.6   VLDL, calculated Latest Units: MG/DL 14.6   LDL, calculated Latest Ref Range: 0 - 100 MG/.4 (H)     10/20 Palpitations are chronic but recently increasing frequency. Get Holter monitor. Get an echo for murmur to rule out any aortic stenosis. Get carotid duplex to rule out carotid stenosis due to bruits which do not sound like radiation of the murmur. 12/20 palpitations are secondary to single PVCs and patient reassured that no further treatment is needed. Blood pressure is uncontrolled. Add losartan and follow home chart. Labs as ordered. Usual hypertensive guidelines discussed including regular walking and reducing salt intake. Recommended that she reduce the salt intake especially with processed meats. 6/14/2021 blood pressure is still elevated. She did not start losartan as was recommended in the previous visit. Represcribed and follow the home chart. Discussed with patient that goal blood pressure will be less than 130 systolic.   She continues to have palpitations which are PVCs as recorded in the Holter monitor. Try to reassure that no specific treatment is required but lowering BP will likely help. Diet discussed and she is trying to follow. She gets enough steps at her work for exercise. 9/23/2021 blood pressure is better but still minimally elevated. Increase losartan 200 mg a day and follow home chart. Reassured patient that her palpitations are due to PVCs which do not need any definite treatment at but we can keep an eye on the LV function and she should report any significant shortness of breath. Minimal hyperlipidemia seen and again diet guidelines were discussed. I do not think any statins are needed yet. Diagnoses and all orders for this visit:    1. Essential hypertension  -     metoprolol tartrate (LOPRESSOR) 50 mg tablet; Take 1 Tablet by mouth two (2) times a day. -     amLODIPine (NORVASC) 10 mg tablet; Take 1 Tablet by mouth daily. -     losartan (COZAAR) 100 mg tablet; Take 1 Tablet by mouth daily. 2. PVC's (premature ventricular contractions)  -     AMB POC EKG ROUTINE W/ 12 LEADS, INTER & REP    3. Hypercholesterolemia        Pertinent laboratory and test data reviewed and discussed with patient. See patient instructions also for other medical advice given    Medications Discontinued During This Encounter   Medication Reason    losartan (COZAAR) 100 mg tablet Non-Compliance    amLODIPine (NORVASC) 10 mg tablet REORDER    metoprolol tartrate (LOPRESSOR) 50 mg tablet REORDER       Follow-up and Dispositions    · Return in about 6 months (around 10/21/2022), or if symptoms worsen or fail to improve, for BP log x 4-5 days post med changes. 4/21/2022 blood pressure is elevated as patient is not compliant with her medications. She was not taking losartan at all. Had a detailed discussion regarding blood pressure and long-term ill effects on the body. Explained that it is a silent killer and damages your heart brain and kidneys.   She agreed to take medicines regularly. Prescription of losartan was rewritten. Follow home chart. Lipids are borderline and will continue diet for it as she does not like to take medications.

## 2022-04-21 NOTE — PROGRESS NOTES
1. Have you been to the ER, urgent care clinic since your last visit? Hospitalized since your last visit?     no  2. Have you seen or consulted any other health care providers outside of the [address] [place] [address] since your last visit? Include any pap smears or colon screening. Yes Where:      3. Since your last visit, have you had any of the following symptoms?      palpitations. 4.  Have you had any blood work, X-rays or cardiac testing? Yes Where: [facility]    5. Where do you normally have your labs drawn? [facility]  6. Do you need any refills today?    yes

## 2022-04-29 ENCOUNTER — TELEPHONE (OUTPATIENT)
Dept: CARDIOLOGY CLINIC | Age: 72
End: 2022-04-29

## 2022-04-29 NOTE — TELEPHONE ENCOUNTER
Spoke with patient regarding bp log per provider bp is improving and pt should continue current medications. No questions or concerns at this time.

## 2023-02-09 ENCOUNTER — OFFICE VISIT (OUTPATIENT)
Dept: CARDIOLOGY CLINIC | Age: 73
End: 2023-02-09
Payer: COMMERCIAL

## 2023-02-09 VITALS
WEIGHT: 147 LBS | OXYGEN SATURATION: 97 % | BODY MASS INDEX: 27.05 KG/M2 | DIASTOLIC BLOOD PRESSURE: 77 MMHG | HEIGHT: 62 IN | HEART RATE: 55 BPM | SYSTOLIC BLOOD PRESSURE: 169 MMHG

## 2023-02-09 DIAGNOSIS — I10 ESSENTIAL HYPERTENSION: Primary | ICD-10-CM

## 2023-02-09 DIAGNOSIS — E78.00 HYPERCHOLESTEROLEMIA: ICD-10-CM

## 2023-02-09 DIAGNOSIS — I49.3 PVC'S (PREMATURE VENTRICULAR CONTRACTIONS): ICD-10-CM

## 2023-02-09 PROCEDURE — 3078F DIAST BP <80 MM HG: CPT | Performed by: INTERNAL MEDICINE

## 2023-02-09 PROCEDURE — 99214 OFFICE O/P EST MOD 30 MIN: CPT | Performed by: INTERNAL MEDICINE

## 2023-02-09 PROCEDURE — 1123F ACP DISCUSS/DSCN MKR DOCD: CPT | Performed by: INTERNAL MEDICINE

## 2023-02-09 PROCEDURE — 3077F SYST BP >= 140 MM HG: CPT | Performed by: INTERNAL MEDICINE

## 2023-02-09 RX ORDER — LOSARTAN POTASSIUM 100 MG/1
100 TABLET ORAL DAILY
Qty: 90 TABLET | Refills: 3 | Status: SHIPPED | OUTPATIENT
Start: 2023-02-09

## 2023-02-09 RX ORDER — METOPROLOL TARTRATE 50 MG/1
50 TABLET ORAL 2 TIMES DAILY
Qty: 180 TABLET | Refills: 3 | Status: SHIPPED | OUTPATIENT
Start: 2023-02-09

## 2023-02-09 RX ORDER — DOXAZOSIN 1 MG/1
1 TABLET ORAL
Qty: 30 TABLET | Refills: 3 | Status: SHIPPED | OUTPATIENT
Start: 2023-02-09

## 2023-02-09 NOTE — PROGRESS NOTES
1. Have you been to the ER, urgent care clinic since your last visit? Hospitalized since your last visit?     no    2. Have you seen or consulted any other health care providers outside of the [address] [place] [address] since your last visit? Include any pap smears or colon screening. Yes Where:       3. Since your last visit, have you had any of the following symptoms? no       4. Have you had any blood work, X-rays or cardiac testing? No         5. Where do you normally have your labs drawn? 6. Do you need any refills today?    yes

## 2023-02-09 NOTE — PROGRESS NOTES
HISTORY OF PRESENT ILLNESS  Dannis Dubin is a 81 y.o. female. Follow-up of hypertension hyperlipidemia and PVCs. 10/20 seen as a new patient. Switching from Dr. Alireza Chun. 4/22 has pain in both arms and right side of the neck which gets worse on motion in a certain position. Follow-up  Pertinent negatives include no chest pain, no headaches and no shortness of breath. Palpitations   The history is provided by the Patient. This is a chronic (2019) problem. The current episode started more than 1 week ago. The problem has not changed since onset. The problem occurs daily (Usually felt in the evening when she is resting and sometimes wakes up from sleep). On average, each episode lasts 20 minutes (Continuous feeling of heartbeat in the years and sometimes it is fast). The problem is associated with nothing. Associated symptoms include malaise/fatigue and dizziness (sometimes). Pertinent negatives include no fever, no chest pain, no claudication, no orthopnea, no PND, no nausea, no vomiting, no headaches, no cough and no shortness of breath. Her past medical history is significant for hypertension.    Allergies   Allergen Reactions    Pcn [Penicillins] Hives       Past Medical History:   Diagnosis Date    CTS (carpal tunnel syndrome)     Hypercholesterolemia 11/30/2010    Hypertension     Hypokalemia 4/14/2011    Vitamin D deficiency 11/30/2010       Family History   Problem Relation Age of Onset    Hypertension Mother     Heart Disease Mother     Arthritis-rheumatoid Mother     Cancer Father     Heart Disease Father     Heart Attack Father 64    Cancer Sister         breast CA    Breast Cancer Sister 39    Other Son         brain tumor    Stroke Neg Hx        Social History     Tobacco Use    Smoking status: Never    Smokeless tobacco: Never   Substance Use Topics    Alcohol use: No    Drug use: No        Current Outpatient Medications   Medication Sig    acetaminophen (TYLENOL) 650 mg TbER Take 650 mg by mouth every eight (8) hours. metoprolol tartrate (LOPRESSOR) 50 mg tablet Take 1 Tablet by mouth two (2) times a day. amLODIPine (NORVASC) 10 mg tablet Take 1 Tablet by mouth daily. losartan (COZAAR) 100 mg tablet Take 1 Tablet by mouth daily. MAGNESIUM PO Take  by mouth. No current facility-administered medications for this visit. Past Surgical History:   Procedure Laterality Date    HX BREAST BIOPSY  1990's    (+) Fibrocystic breast dis. [place] COLONOSCOPY FLX DX W/COLLJ SPEC WHEN PFRMD  2-    polyp; Dr Aristeo Sherwood  BP (!) 169/77   Pulse (!) 55   Ht 5' 2\" (1.575 m)   Wt 66.7 kg (147 lb)   SpO2 97%   BMI 26.89 kg/m²       Diagnostic Studies:  I have reviewed the relevant tests done on the patient and show as follows  EKG tracings reviewed by me today. EKG Results       None          XR Results (most recent):  No results found for this or any previous visit. Ms. Chad Huertas has a reminder for a \"due or due soon\" health maintenance. I have asked that she contact her primary care provider for follow-up on this health maintenance. Review of Systems   Constitutional:  Positive for malaise/fatigue. Negative for chills, fever and weight loss. HENT:  Negative for nosebleeds. Eyes:  Negative for discharge. Respiratory:  Negative for cough, shortness of breath and wheezing. Cardiovascular:  Positive for palpitations. Negative for chest pain, orthopnea, claudication, leg swelling and PND. Gastrointestinal:  Negative for diarrhea, nausea and vomiting. Genitourinary:  Negative for dysuria and hematuria. Musculoskeletal:  Negative for joint pain. Skin:  Negative for rash. Neurological:  Positive for dizziness (sometimes). Negative for seizures, loss of consciousness and headaches. Endo/Heme/Allergies:  Negative for polydipsia. Does not bruise/bleed easily. Psychiatric/Behavioral:  Negative for depression and substance abuse.  The patient does not have insomnia. 11/16 echo  55% EF, mild LVH, mild diastolic dysfunction  96/77 echo  Interpretation Summary     LV: Estimated LVEF is 60 - 65%. Normal cavity size and systolic function (ejection fraction normal). Mild concentric hypertrophy. Wall motion: normal. Mild (grade 1) left ventricular diastolic dysfunction. MV: Trace mitral regurgitation. TV: Trace tricuspid valve regurgitation is present. PA: Pulmonary arterial systolic pressure is 23 mmHg. Comparison Study Information     Prior Study     There is a prior study available for comparison. Prior study date: 11/8/2016. As compared to the previous study, there are no significant changes. 11/20 Holter monitor  Normal sinus rhythm  Less than 1% PACs  2% PVCs with symptoms  4/21 carotid duplex  Interpretation Summary    Carotid Duplex:     1-49% Stenosis of the Internal Carotid Arteries. Bilateral antegrade flow of the Vertebral Arteries  Normal flow of the Subclavian Arteries       Physical Exam  Constitutional:       General: She is not in acute distress. Appearance: She is well-developed. HENT:      Head: Normocephalic and atraumatic. Mouth/Throat:      Dentition: Normal dentition. Eyes:      General: No scleral icterus. Right eye: No discharge. Left eye: No discharge. Neck:      Thyroid: No thyromegaly. Vascular: Carotid bruit present. No JVD. Cardiovascular:      Rate and Rhythm: Normal rate and regular rhythm. Pulses: Intact distal pulses. Heart sounds: S1 normal and S2 normal. Murmur heard. Harsh early systolic murmur is present with a grade of 3/6 at the upper right sternal border radiating to the neck. No friction rub. No gallop. Pulmonary:      Effort: Pulmonary effort is normal.      Breath sounds: Normal breath sounds. No wheezing or rales. Abdominal:      Palpations: Abdomen is soft. There is no mass. Tenderness: There is no abdominal tenderness.    Musculoskeletal:      Cervical back: Neck supple. Right lower leg: No edema. Left lower leg: No edema. Lymphadenopathy:      Cervical:      Right cervical: No superficial cervical adenopathy. Left cervical: No superficial cervical adenopathy. Skin:     General: Skin is warm and dry. Findings: No rash. Neurological:      Mental Status: She is alert and oriented to person, place, and time. Psychiatric:         Behavior: Behavior normal.       ASSESSMENT and PLAN    Component 02/10/22 10/03/20   Cholesterol 217 High  188   Triglyceride 78 85   HDL 67 52   Cholesterol/HDL 3.2 3.6   Non-HDL Cholesterol 150 High  136 High    LDL CALCULATION 135 High  119 High    VLDL CALCULATION 16 17   LDL/HDL Ratio 2.0 2.3     10/20 Palpitations are chronic but recently increasing frequency. Get Holter monitor. Get an echo for murmur to rule out any aortic stenosis. Get carotid duplex to rule out carotid stenosis due to bruits which do not sound like radiation of the murmur. 12/20 palpitations are secondary to single PVCs and patient reassured that no further treatment is needed. Blood pressure is uncontrolled. Add losartan and follow home chart. Labs as ordered. Usual hypertensive guidelines discussed including regular walking and reducing salt intake. Recommended that she reduce the salt intake especially with processed meats. 6/14/2021 blood pressure is still elevated. She did not start losartan as was recommended in the previous visit. Represcribed and follow the home chart. Discussed with patient that goal blood pressure will be less than 917 systolic. She continues to have palpitations which are PVCs as recorded in the Holter monitor. Try to reassure that no specific treatment is required but lowering BP will likely help. Diet discussed and she is trying to follow. She gets enough steps at her work for exercise. 9/23/2021 blood pressure is better but still minimally elevated.   Increase losartan 200 mg a day and follow home chart. Reassured patient that her palpitations are due to PVCs which do not need any definite treatment at but we can keep an eye on the LV function and she should report any significant shortness of breath. Minimal hyperlipidemia seen and again diet guidelines were discussed. I do not think any statins are needed yet. 4/21/2022 blood pressure is elevated as patient is not compliant with her medications. She was not taking losartan at all. Had a detailed discussion regarding blood pressure and long-term ill effects on the body. Explained that it is a silent killer and damages your heart brain and kidneys. She agreed to take medicines regularly. Prescription of losartan was rewritten. Follow home chart. Lipids are borderline and will continue diet for it as she does not like to take medications. Diagnoses and all orders for this visit:    1. Essential hypertension  -     metoprolol tartrate (LOPRESSOR) 50 mg tablet; Take 1 Tablet by mouth two (2) times a day. -     losartan (COZAAR) 100 mg tablet; Take 1 Tablet by mouth daily. -     doxazosin (CARDURA) 1 mg tablet; Take 1 Tablet by mouth nightly. -     METABOLIC PANEL, BASIC; Future  -     LIPID PANEL; Future  -     HEPATIC FUNCTION PANEL; Future  -     TSH 3RD GENERATION; Future    2. PVC's (premature ventricular contractions)    3. Hypercholesterolemia        Pertinent laboratory and test data reviewed and discussed with patient. See patient instructions also for other medical advice given    Medications Discontinued During This Encounter   Medication Reason    metoprolol tartrate (LOPRESSOR) 50 mg tablet REORDER    losartan (COZAAR) 100 mg tablet REORDER       Follow-up and Dispositions    Return in about 6 months (around 8/9/2023), or if symptoms worsen or fail to improve, for BP log x 4-5 days post med changes. 2/9/2023 continues to have palpitations off and on as before but no associated hemodynamic symptoms.   Blood pressure is still elevated despite taking 3 medications regularly. Will add doxazosin and follow the home BP chart. Discussed about diet and recommended to stop eating processed meats. She did admit to eating a lot of ground beef. Labs as ordered including thyroid functions.

## 2023-02-09 NOTE — PATIENT INSTRUCTIONS
Medications Discontinued During This Encounter   Medication Reason    metoprolol tartrate (LOPRESSOR) 50 mg tablet REORDER    losartan (COZAAR) 100 mg tablet REORDER         Learning About the Mediterranean Diet  What is the Mediterranean diet? The Mediterranean diet is a style of eating rather than a diet plan. It features foods eaten in [place], [place], [place] and [place], and other countries along the [facility]. It emphasizes eating foods like fish, fruits, vegetables, beans, high-fiber breads and whole grains, nuts, and olive oil. This style of eating includes limited red meat, cheese, and sweets. Why choose the Mediterranean diet? A Mediterranean-style diet may improve heart health. It contains more fat than other heart-healthy diets. But the fats are mainly from nuts, unsaturated oils (such as fish oils and olive oil), and certain nut or seed oils (such as canola, soybean, or flaxseed oil). These fats may help protect the heart and blood vessels. How can you get started on the Mediterranean diet? Here are some things you can do to switch to a more Mediterranean way of eating. What to eat  Eat a variety of fruits and vegetables each day, such as grapes, blueberries, tomatoes, broccoli, peppers, figs, olives, spinach, eggplant, beans, lentils, and chickpeas. Eat a variety of whole-grain foods each day, such as oats, brown rice, and whole wheat bread, pasta, and couscous. Eat fish at least 2 times a week. Try tuna, salmon, mackerel, lake trout, herring, or sardines. Eat moderate amounts of low-fat dairy products, such as milk, cheese, or yogurt. Eat moderate amounts of poultry and eggs. Choose healthy (unsaturated) fats, such as nuts, olive oil, and certain nut or seed oils like canola, soybean, and flaxseed. Limit unhealthy (saturated) fats, such as butter, palm oil, and coconut oil. And limit fats found in animal products, such as meat and dairy products made with whole milk.  Try to eat red meat only a few times a month in very small amounts. Limit sweets and desserts to only a few times a week. This includes sugar-sweetened drinks like soda. The Mediterranean diet may also include red wine with your meal--1 glass each day for women and up to 2 glasses a day for men. Tips for eating at home  Use herbs, spices, garlic, lemon zest, and citrus juice instead of salt to add flavor to foods. Add avocado slices to your sandwich instead of carter. Have fish for lunch or dinner instead of red meat. Brush the fish with olive oil, and broil or grill it. Sprinkle your salad with seeds or nuts instead of cheese. Cook with olive or canola oil instead of butter or oils that are high in saturated fat. Switch from 2% milk or whole milk to 1% or fat-free milk. Dip raw vegetables in a vinaigrette dressing or hummus instead of dips made from mayonnaise or sour cream.  Have a piece of fruit for dessert instead of a piece of cake. Try baked apples, or have some dried fruit. Tips for eating out  Try broiled, grilled, baked, or poached fish instead of having it fried or breaded. Ask your  to have your meals prepared with olive oil instead of butter. Order dishes made with marinara sauce or sauces made from olive oil. Avoid sauces made from cream or mayonnaise. Choose whole-grain breads, whole wheat pasta and pizza crust, brown rice, beans, and lentils. Cut back on butter or margarine on bread. Instead, you can dip your bread in a small amount of olive oil. Ask for a side salad or grilled vegetables instead of french fries or chips. Where can you learn more? Go to http://www.[employer].com/  Enter O407 in the search box to learn more about \"Learning About the Mediterranean Diet. \"  Current as of: May 9, 2022               Content Version: 13.4  © 7691-3820 [employer].    Care instructions adapted under license by [employer] (which disclaims liability or warranty for this information). If you have questions about a medical condition or this instruction, always ask your healthcare professional. [place] [address] any warranty or liability for your use of this information.

## 2023-02-16 LAB — LDL CHOLESTEROL, EXTERNAL: 118

## 2023-03-01 ENCOUNTER — TELEPHONE (OUTPATIENT)
Age: 73
End: 2023-03-01

## 2023-03-01 DIAGNOSIS — I10 ESSENTIAL (PRIMARY) HYPERTENSION: ICD-10-CM

## 2023-03-01 DIAGNOSIS — E78.00 PURE HYPERCHOLESTEROLEMIA, UNSPECIFIED: Primary | ICD-10-CM

## 2023-03-01 NOTE — TELEPHONE ENCOUNTER
Called and left several message with patient and her family member to call office regarding lab results.  Will try later

## 2023-03-02 RX ORDER — ATORVASTATIN CALCIUM 10 MG/1
10 TABLET, FILM COATED ORAL DAILY
Qty: 90 TABLET | Refills: 3 | Status: SHIPPED | OUTPATIENT
Start: 2023-03-02

## 2023-03-02 NOTE — TELEPHONE ENCOUNTER
Called and left message on patient son regarding patient labs  Per Dr. Gordon Jain paper message . Start Lipitor 10 mg daily. Lipids/Lfts 6 weeks. If any questions to call office.

## 2023-03-02 NOTE — TELEPHONE ENCOUNTER
Requested Prescriptions     Pending Prescriptions Disp Refills    atorvastatin (LIPITOR) 10 MG tablet 90 tablet 3     Sig: Take 1 tablet by mouth daily

## 2023-08-31 LAB
A/G RATIO: 1.3 RATIO (ref 1.1–2.6)
ALBUMIN SERPL-MCNC: 4.1 G/DL (ref 3.5–5)
ALP BLD-CCNC: 84 U/L (ref 40–120)
ALT SERPL-CCNC: 12 U/L (ref 5–40)
AST SERPL-CCNC: 16 U/L (ref 10–37)
BILIRUB SERPL-MCNC: 0.5 MG/DL (ref 0.2–1.2)
BILIRUBIN DIRECT: <0.2 MG/DL (ref 0–0.3)
CHOLESTEROL/HDL RATIO: 2.5 (ref 0–5)
CHOLESTEROL: 155 MG/DL (ref 110–200)
GLOBULIN: 3.2 G/DL (ref 2–4)
HDLC SERPL-MCNC: 63 MG/DL
LDL CHOLESTEROL CALCULATED: 78 MG/DL (ref 50–99)
LDL/HDL RATIO: 1.2
NON-HDL CHOLESTEROL: 92 MG/DL
TOTAL PROTEIN: 7.3 G/DL (ref 6.2–8.1)
TRIGL SERPL-MCNC: 70 MG/DL (ref 40–149)
VLDLC SERPL CALC-MCNC: 14 MG/DL (ref 8–30)

## 2023-09-25 ENCOUNTER — OFFICE VISIT (OUTPATIENT)
Age: 73
End: 2023-09-25
Payer: COMMERCIAL

## 2023-09-25 VITALS
SYSTOLIC BLOOD PRESSURE: 150 MMHG | DIASTOLIC BLOOD PRESSURE: 60 MMHG | HEIGHT: 62 IN | BODY MASS INDEX: 27.09 KG/M2 | OXYGEN SATURATION: 98 % | HEART RATE: 61 BPM | WEIGHT: 147.2 LBS

## 2023-09-25 DIAGNOSIS — I49.3 VENTRICULAR PREMATURE DEPOLARIZATION: ICD-10-CM

## 2023-09-25 DIAGNOSIS — I10 ESSENTIAL (PRIMARY) HYPERTENSION: Primary | ICD-10-CM

## 2023-09-25 DIAGNOSIS — E78.00 PURE HYPERCHOLESTEROLEMIA, UNSPECIFIED: ICD-10-CM

## 2023-09-25 PROCEDURE — 99214 OFFICE O/P EST MOD 30 MIN: CPT | Performed by: NURSE PRACTITIONER

## 2023-09-25 PROCEDURE — 93000 ELECTROCARDIOGRAM COMPLETE: CPT | Performed by: NURSE PRACTITIONER

## 2023-09-25 PROCEDURE — 1123F ACP DISCUSS/DSCN MKR DOCD: CPT | Performed by: NURSE PRACTITIONER

## 2023-09-25 PROCEDURE — 3077F SYST BP >= 140 MM HG: CPT | Performed by: NURSE PRACTITIONER

## 2023-09-25 PROCEDURE — 3078F DIAST BP <80 MM HG: CPT | Performed by: NURSE PRACTITIONER

## 2023-09-25 RX ORDER — LOSARTAN POTASSIUM 100 MG/1
100 TABLET ORAL DAILY
Qty: 30 TABLET | Refills: 3 | Status: SHIPPED | OUTPATIENT
Start: 2023-09-25

## 2023-09-25 RX ORDER — METOPROLOL TARTRATE 50 MG/1
50 TABLET, FILM COATED ORAL 2 TIMES DAILY
Qty: 60 TABLET | Refills: 3 | Status: SHIPPED | OUTPATIENT
Start: 2023-09-25

## 2023-09-25 RX ORDER — AMLODIPINE BESYLATE 10 MG/1
10 TABLET ORAL DAILY
Qty: 30 TABLET | Refills: 3 | Status: SHIPPED | OUTPATIENT
Start: 2023-09-25

## 2023-09-25 NOTE — PROGRESS NOTES
1. Have you been to the ER, urgent care clinic since your last visit? Hospitalized since your last visit? No    2. Have you seen or consulted any other health care providers outside of the [address] [place] [address] since your last visit? Include any pap smears or colon screening.  No

## 2023-09-25 NOTE — PROGRESS NOTES
HISTORY OF PRESENT ILLNESS  Merna Kinney is a 81 y.o. female. Follow-up of hypertension hyperlipidemia and PVCs. 10/20 seen as a new patient. Switching from Dr. Bhargavi Smith. 4/22 has pain in both arms and right side of the neck which gets worse on motion in a certain position. 9/2023 Patient seen in f/u for HTN. Follow-up  Pertinent negatives include no chest pain, no headaches and no shortness of breath. Palpitations   The history is provided by the Patient. This is a chronic (2019) problem. The current episode started more than 1 week ago. The problem has not changed since onset. The problem occurs daily (Usually felt in the evening when she is resting and sometimes wakes up from sleep). On average, each episode lasts 20 minutes (Continuous feeling of heartbeat in the years and sometimes it is fast). The problem is associated with nothing. Associated symptoms include malaise/fatigue and dizziness (sometimes). Pertinent negatives include no fever, no chest pain, no claudication, no orthopnea, no PND, no nausea, no vomiting, no headaches, no cough and no shortness of breath. Her past medical history is significant for hypertension. Family History   Problem Relation Age of Onset    Heart Disease Mother     Rheum Arthritis Mother     Cancer Father     Other Son         brain tumor    Heart Attack Father 64    Cancer Sister         breast CA    Stroke Neg Hx     Breast Cancer Sister 39    Hypertension Mother     Heart Disease Father        Past Medical History:   Diagnosis Date    CTS (carpal tunnel syndrome)     Hypercholesterolemia 11/30/2010    Hypertension     Hypokalemia 4/14/2011    Vitamin D deficiency 11/30/2010       Past Surgical History:   Procedure Laterality Date    BREAST BIOPSY  1990's    (+) Fibrocystic breast dis.     COLONOSCOPY FLX DX W/COLLJ SPEC WHEN PFRMD  2-    polyp; Dr Frandy Butler History     Tobacco Use    Smoking status: Never    Smokeless tobacco: Never

## 2023-09-25 NOTE — PATIENT INSTRUCTIONS
Resume doxazosin  After the recommended changes have been made in blood pressure medicines, patient advised to keep BP/HR(pulse rate) chart twice daily and bring us results in next 4 to 5 days. Patient may send the results via \"My Chart\" if desired. Please rest for 5-10 minutes before checking blood pressure. Sit on a comfortable chair without crossing the legs and put your arm on a table. We recommend that you use an upper arm cuff. Check the blood pressure 3 times each time you check the blood pressure and record the lowest reading. If you check the blood pressure in both arms, use the higher reading.

## 2024-03-20 ENCOUNTER — OFFICE VISIT (OUTPATIENT)
Age: 74
End: 2024-03-20
Payer: MEDICARE

## 2024-03-20 VITALS
OXYGEN SATURATION: 95 % | HEIGHT: 62 IN | BODY MASS INDEX: 27.05 KG/M2 | HEART RATE: 60 BPM | SYSTOLIC BLOOD PRESSURE: 148 MMHG | DIASTOLIC BLOOD PRESSURE: 62 MMHG | WEIGHT: 147 LBS

## 2024-03-20 DIAGNOSIS — I10 ESSENTIAL (PRIMARY) HYPERTENSION: Primary | ICD-10-CM

## 2024-03-20 DIAGNOSIS — E78.00 PURE HYPERCHOLESTEROLEMIA, UNSPECIFIED: ICD-10-CM

## 2024-03-20 DIAGNOSIS — I49.3 VENTRICULAR PREMATURE DEPOLARIZATION: ICD-10-CM

## 2024-03-20 PROCEDURE — 3078F DIAST BP <80 MM HG: CPT | Performed by: INTERNAL MEDICINE

## 2024-03-20 PROCEDURE — 1123F ACP DISCUSS/DSCN MKR DOCD: CPT | Performed by: INTERNAL MEDICINE

## 2024-03-20 PROCEDURE — 99214 OFFICE O/P EST MOD 30 MIN: CPT | Performed by: INTERNAL MEDICINE

## 2024-03-20 PROCEDURE — 3077F SYST BP >= 140 MM HG: CPT | Performed by: INTERNAL MEDICINE

## 2024-03-20 RX ORDER — METOPROLOL TARTRATE 50 MG/1
50 TABLET, FILM COATED ORAL 2 TIMES DAILY
Qty: 180 TABLET | Refills: 3 | Status: SHIPPED | OUTPATIENT
Start: 2024-03-20

## 2024-03-20 RX ORDER — LOSARTAN POTASSIUM 100 MG/1
100 TABLET ORAL DAILY
Qty: 90 TABLET | Refills: 3 | Status: SHIPPED | OUTPATIENT
Start: 2024-03-20

## 2024-03-20 RX ORDER — AMLODIPINE BESYLATE 10 MG/1
10 TABLET ORAL DAILY
Qty: 90 TABLET | Refills: 3 | Status: SHIPPED | OUTPATIENT
Start: 2024-03-20

## 2024-03-20 RX ORDER — ATORVASTATIN CALCIUM 10 MG/1
10 TABLET, FILM COATED ORAL DAILY
Qty: 90 TABLET | Refills: 3 | Status: SHIPPED | OUTPATIENT
Start: 2024-03-20

## 2024-03-20 RX ORDER — DOXAZOSIN MESYLATE 1 MG/1
1 TABLET ORAL DAILY
Qty: 30 TABLET | Refills: 3 | Status: SHIPPED | OUTPATIENT
Start: 2024-03-20

## 2024-03-20 ASSESSMENT — ENCOUNTER SYMPTOMS
GASTROINTESTINAL NEGATIVE: 1
RESPIRATORY NEGATIVE: 1

## 2024-03-20 NOTE — PROGRESS NOTES
Carolina James is a 73 y.o. year old female.    Follow-up of hypertension hyperlipidemia and PVCs.    10/20 seen as a new patient.  Switching from Dr. Daniel.  4/22 has pain in both arms and right side of the neck which gets worse on motion in a certain position.  9/2023 Patient seen in f/u for HTN.   3/20/2024 palpitations continue to bother her.  Get worse if she goes up the steps or does any physical exertion and they calm down when she rests.  Denies any chest pain dyspnea edema or dizziness.          Review of Systems   Constitutional: Negative.    HENT: Negative.     Eyes: Negative.    Respiratory: Negative.     Cardiovascular:  Positive for palpitations.   Gastrointestinal: Negative.    Endocrine: Negative.    Genitourinary: Negative.    Musculoskeletal: Negative.    Neurological: Negative.    Psychiatric/Behavioral: Negative.     All other systems reviewed and are negative.        Physical Exam  Vitals and nursing note reviewed.   Constitutional:       Appearance: Normal appearance.   HENT:      Head: Normocephalic and atraumatic.      Nose: Nose normal.   Eyes:      Conjunctiva/sclera: Conjunctivae normal.   Cardiovascular:      Rate and Rhythm: Normal rate and regular rhythm.      Pulses: Normal pulses.      Heart sounds: Murmur heard.      Harsh early systolic murmur is present with a grade of 2/6 at the upper right sternal border.   Pulmonary:      Effort: Pulmonary effort is normal.      Breath sounds: Normal breath sounds.   Abdominal:      General: Bowel sounds are normal.      Palpations: Abdomen is soft.   Musculoskeletal:         General: Normal range of motion.      Right lower leg: No edema.      Left lower leg: No edema.   Skin:     General: Skin is warm and dry.   Neurological:      General: No focal deficit present.      Mental Status: She is alert and oriented to person, place, and time.   Psychiatric:         Mood and Affect: Mood normal.         Behavior: Behavior normal.

## 2024-04-19 LAB
A/G RATIO: 1.5 RATIO (ref 1.1–2.6)
ALBUMIN: 4.2 G/DL (ref 3.5–5)
ALP BLD-CCNC: 77 U/L (ref 40–120)
ALT SERPL-CCNC: 14 U/L (ref 5–40)
ANION GAP SERPL CALCULATED.3IONS-SCNC: 9 MMOL/L (ref 3–15)
AST SERPL-CCNC: 15 U/L (ref 10–37)
BILIRUB SERPL-MCNC: 0.3 MG/DL (ref 0.2–1.2)
BILIRUBIN DIRECT: <0.2 MG/DL (ref 0–0.3)
BUN BLDV-MCNC: 12 MG/DL (ref 6–22)
CALCIUM SERPL-MCNC: 9.2 MG/DL (ref 8.4–10.5)
CHLORIDE BLD-SCNC: 106 MMOL/L (ref 98–110)
CHOLESTEROL/HDL RATIO: 2.1 (ref 0–5)
CHOLESTEROL: 141 MG/DL (ref 110–200)
CO2: 26 MMOL/L (ref 20–32)
CREAT SERPL-MCNC: 0.5 MG/DL (ref 0.8–1.4)
GLOBULIN: 2.8 G/DL (ref 2–4)
GLOMERULAR FILTRATION RATE: >60 ML/MIN/1.73 SQ.M.
GLUCOSE: 95 MG/DL (ref 70–99)
HDLC SERPL-MCNC: 66 MG/DL
LDL CHOLESTEROL CALCULATED: 63 MG/DL (ref 50–99)
LDL/HDL RATIO: 1
NON-HDL CHOLESTEROL: 75 MG/DL
POTASSIUM SERPL-SCNC: 4.3 MMOL/L (ref 3.5–5.5)
SODIUM BLD-SCNC: 141 MMOL/L (ref 133–145)
TOTAL PROTEIN: 7 G/DL (ref 6.2–8.1)
TRIGL SERPL-MCNC: 56 MG/DL (ref 40–149)
TSH SERPL DL<=0.05 MIU/L-ACNC: 1.42 MCU/ML (ref 0.27–4.2)
VLDLC SERPL CALC-MCNC: 11 MG/DL (ref 8–30)

## 2024-09-30 ENCOUNTER — OFFICE VISIT (OUTPATIENT)
Age: 74
End: 2024-09-30
Payer: MEDICARE

## 2024-09-30 VITALS
OXYGEN SATURATION: 96 % | HEIGHT: 62 IN | DIASTOLIC BLOOD PRESSURE: 73 MMHG | HEART RATE: 58 BPM | SYSTOLIC BLOOD PRESSURE: 135 MMHG | BODY MASS INDEX: 26.87 KG/M2 | WEIGHT: 146 LBS

## 2024-09-30 DIAGNOSIS — E78.00 PURE HYPERCHOLESTEROLEMIA: ICD-10-CM

## 2024-09-30 DIAGNOSIS — I10 ESSENTIAL (PRIMARY) HYPERTENSION: Primary | ICD-10-CM

## 2024-09-30 DIAGNOSIS — I49.3 VENTRICULAR PREMATURE DEPOLARIZATION: ICD-10-CM

## 2024-09-30 PROBLEM — I11.9 HYPERTENSIVE HEART DISEASE WITHOUT HEART FAILURE: Status: RESOLVED | Noted: 2017-01-21 | Resolved: 2024-09-30

## 2024-09-30 PROCEDURE — 99214 OFFICE O/P EST MOD 30 MIN: CPT | Performed by: INTERNAL MEDICINE

## 2024-09-30 PROCEDURE — 1123F ACP DISCUSS/DSCN MKR DOCD: CPT | Performed by: INTERNAL MEDICINE

## 2024-09-30 PROCEDURE — 3078F DIAST BP <80 MM HG: CPT | Performed by: INTERNAL MEDICINE

## 2024-09-30 PROCEDURE — 93000 ELECTROCARDIOGRAM COMPLETE: CPT | Performed by: INTERNAL MEDICINE

## 2024-09-30 PROCEDURE — 3075F SYST BP GE 130 - 139MM HG: CPT | Performed by: INTERNAL MEDICINE

## 2024-09-30 RX ORDER — LOSARTAN POTASSIUM 100 MG/1
100 TABLET ORAL DAILY
Qty: 90 TABLET | Refills: 3 | Status: SHIPPED | OUTPATIENT
Start: 2024-09-30

## 2024-09-30 RX ORDER — ATORVASTATIN CALCIUM 10 MG/1
10 TABLET, FILM COATED ORAL DAILY
Qty: 90 TABLET | Refills: 3 | Status: SHIPPED | OUTPATIENT
Start: 2024-09-30

## 2024-09-30 RX ORDER — METOPROLOL TARTRATE 50 MG
50 TABLET ORAL 2 TIMES DAILY
Qty: 180 TABLET | Refills: 3 | Status: SHIPPED | OUTPATIENT
Start: 2024-09-30

## 2024-09-30 RX ORDER — AMLODIPINE BESYLATE 10 MG/1
10 TABLET ORAL DAILY
Qty: 90 TABLET | Refills: 3 | Status: SHIPPED | OUTPATIENT
Start: 2024-09-30

## 2024-09-30 ASSESSMENT — ENCOUNTER SYMPTOMS
EYES NEGATIVE: 1
RESPIRATORY NEGATIVE: 1
GASTROINTESTINAL NEGATIVE: 1

## 2024-09-30 NOTE — PROGRESS NOTES
Room 5    Patient brought medication list.    1. Have you been to the ER, urgent care clinic since your last visit?  Hospitalized since your last visit? No    2.  Where do you normally have your labs drawn?  [facility]      3. Do you need any refills today? Yes      4. Which local pharmacy do you use?   [facility]      5. Which mail order pharmacy do you use?   None       6. Are you here for surgical clearance? No,  who will be doing your procedure/surgery (care team)?   N/A

## 2024-09-30 NOTE — PROGRESS NOTES
Carolina James is a 73 y.o. year old female.    Follow-up of hypertension hyperlipidemia and PVCs.    10/20 seen as a new patient.  Switching from Dr. Daniel.  4/22 has pain in both arms and right side of the neck which gets worse on motion in a certain position.  9/2023 Patient seen in f/u for HTN.   3/20/2024 palpitations continue to bother her.  Get worse if she goes up the steps or does any physical exertion and they calm down when she rests.  Denies any chest pain dyspnea edema or dizziness.  9/30/2024 palpitations somewhat less than before but still happen.  No chest pain shortness of breath edema.          Review of Systems   Constitutional: Negative.    HENT: Negative.     Eyes: Negative.    Respiratory: Negative.     Cardiovascular:  Positive for palpitations.   Gastrointestinal: Negative.    Endocrine: Negative.    Genitourinary: Negative.    Musculoskeletal: Negative.    Neurological: Negative.    Psychiatric/Behavioral: Negative.     All other systems reviewed and are negative.        Physical Exam  Vitals and nursing note reviewed.   Constitutional:       Appearance: Normal appearance.   HENT:      Head: Normocephalic and atraumatic.      Nose: Nose normal.   Eyes:      Conjunctiva/sclera: Conjunctivae normal.   Cardiovascular:      Rate and Rhythm: Normal rate and regular rhythm.      Pulses: Normal pulses.      Heart sounds: Murmur heard.      Harsh early systolic murmur is present with a grade of 2/6 at the upper right sternal border.   Pulmonary:      Effort: Pulmonary effort is normal.      Breath sounds: Normal breath sounds.   Abdominal:      General: Bowel sounds are normal.      Palpations: Abdomen is soft.   Musculoskeletal:         General: Normal range of motion.      Right lower leg: No edema.      Left lower leg: No edema.   Skin:     General: Skin is warm and dry.   Neurological:      General: No focal deficit present.      Mental Status: She is alert and oriented to person, place, and